# Patient Record
Sex: MALE | Race: WHITE | NOT HISPANIC OR LATINO | Employment: OTHER | URBAN - METROPOLITAN AREA
[De-identification: names, ages, dates, MRNs, and addresses within clinical notes are randomized per-mention and may not be internally consistent; named-entity substitution may affect disease eponyms.]

---

## 2020-11-15 ENCOUNTER — HOSPITAL ENCOUNTER (OUTPATIENT)
Dept: TELEMEDICINE | Facility: HOSPITAL | Age: 55
Discharge: HOME | End: 2020-11-15
Payer: MEDICARE

## 2020-11-15 DIAGNOSIS — I63.411 CEREBROVASCULAR ACCIDENT (CVA) DUE TO EMBOLISM OF RIGHT MIDDLE CEREBRAL ARTERY: ICD-10-CM

## 2020-11-15 PROCEDURE — G0427 PR INPT TELEHEALTH CON 70/>M: ICD-10-PCS | Mod: 95,,, | Performed by: PSYCHIATRY & NEUROLOGY

## 2020-11-15 PROCEDURE — G0427 INPT/ED TELECONSULT70: HCPCS | Mod: 95,,, | Performed by: PSYCHIATRY & NEUROLOGY

## 2020-11-16 ENCOUNTER — HOSPITAL ENCOUNTER (INPATIENT)
Facility: HOSPITAL | Age: 55
LOS: 1 days | Discharge: HOME | DRG: 064 | End: 2020-11-17
Attending: PSYCHIATRY & NEUROLOGY | Admitting: PSYCHIATRY & NEUROLOGY
Payer: MEDICARE

## 2020-11-16 DIAGNOSIS — I63.412 CEREBROVASCULAR ACCIDENT (CVA) DUE TO EMBOLISM OF LEFT MIDDLE CEREBRAL ARTERY: Primary | ICD-10-CM

## 2020-11-16 DIAGNOSIS — G45.9 TIA (TRANSIENT ISCHEMIC ATTACK): ICD-10-CM

## 2020-11-16 DIAGNOSIS — I63.9 STROKE: ICD-10-CM

## 2020-11-16 DIAGNOSIS — R53.1 WEAKNESS: ICD-10-CM

## 2020-11-16 PROBLEM — U07.1 COVID-19 VIRUS INFECTION: Status: ACTIVE | Noted: 2020-11-16

## 2020-11-16 PROBLEM — E66.09 OBESITY DUE TO EXCESS CALORIES: Status: ACTIVE | Noted: 2020-11-16

## 2020-11-16 LAB
ALBUMIN SERPL BCP-MCNC: 3.5 G/DL (ref 3.5–5.2)
ALLENS TEST: ABNORMAL
ALP SERPL-CCNC: 82 U/L (ref 55–135)
ALT SERPL W/O P-5'-P-CCNC: 21 U/L (ref 10–44)
ANION GAP SERPL CALC-SCNC: 9 MMOL/L (ref 8–16)
AST SERPL-CCNC: 21 U/L (ref 10–40)
BASOPHILS # BLD AUTO: 0.02 K/UL (ref 0–0.2)
BASOPHILS NFR BLD: 0.3 % (ref 0–1.9)
BILIRUB SERPL-MCNC: 0.3 MG/DL (ref 0.1–1)
BSA FOR ECHO PROCEDURE: 2.74 M2
BUN SERPL-MCNC: 15 MG/DL (ref 6–20)
CALCIUM SERPL-MCNC: 8.8 MG/DL (ref 8.7–10.5)
CHLORIDE SERPL-SCNC: 107 MMOL/L (ref 95–110)
CHOLEST SERPL-MCNC: 184 MG/DL (ref 120–199)
CHOLEST/HDLC SERPL: 4.5 {RATIO} (ref 2–5)
CO2 SERPL-SCNC: 25 MMOL/L (ref 23–29)
CREAT SERPL-MCNC: 0.9 MG/DL (ref 0.5–1.4)
CREAT SERPL-MCNC: 1 MG/DL (ref 0.5–1.4)
CTP QC/QA: YES
CV ECHO LV RWT: 0.38 CM
D DIMER PPP IA.FEU-MCNC: 1.26 MG/L FEU
DIFFERENTIAL METHOD: ABNORMAL
DOP CALC LVOT AREA: 4.9 CM2
DOP CALC LVOT DIAMETER: 2.51 CM
ECHO LV POSTERIOR WALL: 0.96 CM (ref 0.6–1.1)
EOSINOPHIL # BLD AUTO: 0 K/UL (ref 0–0.5)
EOSINOPHIL NFR BLD: 0.4 % (ref 0–8)
ERYTHROCYTE [DISTWIDTH] IN BLOOD BY AUTOMATED COUNT: 12.9 % (ref 11.5–14.5)
EST. GFR  (AFRICAN AMERICAN): >60 ML/MIN/1.73 M^2
EST. GFR  (NON AFRICAN AMERICAN): >60 ML/MIN/1.73 M^2
ESTIMATED AVG GLUCOSE: 114 MG/DL (ref 68–131)
FRACTIONAL SHORTENING: 38 % (ref 28–44)
GLUCOSE SERPL-MCNC: 143 MG/DL (ref 70–110)
GLUCOSE SERPL-MCNC: 146 MG/DL (ref 70–110)
HBA1C MFR BLD HPLC: 5.6 % (ref 4–5.6)
HCO3 UR-SCNC: 29.7 MMOL/L (ref 24–28)
HCT VFR BLD AUTO: 40.1 % (ref 40–54)
HDLC SERPL-MCNC: 41 MG/DL (ref 40–75)
HDLC SERPL: 22.3 % (ref 20–50)
HGB BLD-MCNC: 13.3 G/DL (ref 14–18)
IMM GRANULOCYTES # BLD AUTO: 0.03 K/UL (ref 0–0.04)
IMM GRANULOCYTES NFR BLD AUTO: 0.4 % (ref 0–0.5)
INR PPP: 1 (ref 0.8–1.2)
INTERVENTRICULAR SEPTUM: 0.89 CM (ref 0.6–1.1)
LDLC SERPL CALC-MCNC: 119.8 MG/DL (ref 63–159)
LEFT ATRIUM SIZE: 3.18 CM
LEFT INTERNAL DIMENSION IN SYSTOLE: 3.09 CM (ref 2.1–4)
LEFT VENTRICLE DIASTOLIC VOLUME INDEX: 44.53 ML/M2
LEFT VENTRICLE DIASTOLIC VOLUME: 118.7 ML
LEFT VENTRICLE MASS INDEX: 62 G/M2
LEFT VENTRICLE SYSTOLIC VOLUME INDEX: 14.1 ML/M2
LEFT VENTRICLE SYSTOLIC VOLUME: 37.5 ML
LEFT VENTRICULAR INTERNAL DIMENSION IN DIASTOLE: 5.01 CM (ref 3.5–6)
LEFT VENTRICULAR MASS: 164.57 G
LYMPHOCYTES # BLD AUTO: 1.5 K/UL (ref 1–4.8)
LYMPHOCYTES NFR BLD: 20.2 % (ref 18–48)
MCH RBC QN AUTO: 31.4 PG (ref 27–31)
MCHC RBC AUTO-ENTMCNC: 33.2 G/DL (ref 32–36)
MCV RBC AUTO: 95 FL (ref 82–98)
MONOCYTES # BLD AUTO: 0.8 K/UL (ref 0.3–1)
MONOCYTES NFR BLD: 10.8 % (ref 4–15)
MV PEAK E VEL: 0.92 M/S
NEUTROPHILS # BLD AUTO: 5.1 K/UL (ref 1.8–7.7)
NEUTROPHILS NFR BLD: 67.9 % (ref 38–73)
NONHDLC SERPL-MCNC: 143 MG/DL
NRBC BLD-RTO: 0 /100 WBC
PCO2 BLDA: 49.9 MMHG (ref 35–45)
PH SMN: 7.38 [PH] (ref 7.35–7.45)
PHENYTOIN SERPL-MCNC: 1.7 UG/ML (ref 10–20)
PLATELET # BLD AUTO: 230 K/UL (ref 150–350)
PMV BLD AUTO: 9.4 FL (ref 9.2–12.9)
PO2 BLDA: 30 MMHG (ref 40–60)
POC BE: 5 MMOL/L
POC PTINR: 1 (ref 0.9–1.2)
POC PTWBT: 12.3 SEC (ref 9.7–14.3)
POC SATURATED O2: 55 % (ref 95–100)
POC TCO2: 31 MMOL/L (ref 24–29)
POCT GLUCOSE: 146 MG/DL (ref 70–110)
POCT GLUCOSE: 98 MG/DL (ref 70–110)
POTASSIUM SERPL-SCNC: 3.7 MMOL/L (ref 3.5–5.1)
PROT SERPL-MCNC: 6.7 G/DL (ref 6–8.4)
PROTHROMBIN TIME: 11.1 SEC (ref 9–12.5)
RA PRESSURE: 15 MMHG
RBC # BLD AUTO: 4.23 M/UL (ref 4.6–6.2)
SAMPLE: ABNORMAL
SAMPLE: NORMAL
SAMPLE: NORMAL
SARS-COV-2 RDRP RESP QL NAA+PROBE: POSITIVE
SINUS: 4.05 CM
SITE: ABNORMAL
SODIUM SERPL-SCNC: 141 MMOL/L (ref 136–145)
STJ: 3.8 CM
TRIGL SERPL-MCNC: 116 MG/DL (ref 30–150)
TSH SERPL DL<=0.005 MIU/L-ACNC: 2.37 UIU/ML (ref 0.4–4)
WBC # BLD AUTO: 7.49 K/UL (ref 3.9–12.7)

## 2020-11-16 PROCEDURE — 80185 ASSAY OF PHENYTOIN TOTAL: CPT

## 2020-11-16 PROCEDURE — 80061 LIPID PANEL: CPT

## 2020-11-16 PROCEDURE — 99900035 HC TECH TIME PER 15 MIN (STAT)

## 2020-11-16 PROCEDURE — 25000003 PHARM REV CODE 250: Performed by: NURSE PRACTITIONER

## 2020-11-16 PROCEDURE — 99223 PR INITIAL HOSPITAL CARE,LEVL III: ICD-10-PCS | Mod: ,,, | Performed by: PSYCHIATRY & NEUROLOGY

## 2020-11-16 PROCEDURE — 82803 BLOOD GASES ANY COMBINATION: CPT

## 2020-11-16 PROCEDURE — 84443 ASSAY THYROID STIM HORMONE: CPT

## 2020-11-16 PROCEDURE — 80053 COMPREHEN METABOLIC PANEL: CPT

## 2020-11-16 PROCEDURE — U0002 COVID-19 LAB TEST NON-CDC: HCPCS | Performed by: PHYSICIAN ASSISTANT

## 2020-11-16 PROCEDURE — 99285 PR EMERGENCY DEPT VISIT,LEVEL V: ICD-10-PCS | Mod: ,,, | Performed by: PHYSICIAN ASSISTANT

## 2020-11-16 PROCEDURE — 83036 HEMOGLOBIN GLYCOSYLATED A1C: CPT

## 2020-11-16 PROCEDURE — 99223 1ST HOSP IP/OBS HIGH 75: CPT | Mod: ,,, | Performed by: PSYCHIATRY & NEUROLOGY

## 2020-11-16 PROCEDURE — 93010 ELECTROCARDIOGRAM REPORT: CPT | Mod: ,,, | Performed by: INTERNAL MEDICINE

## 2020-11-16 PROCEDURE — 36415 COLL VENOUS BLD VENIPUNCTURE: CPT

## 2020-11-16 PROCEDURE — 99285 EMERGENCY DEPT VISIT HI MDM: CPT | Mod: 25

## 2020-11-16 PROCEDURE — 85610 PROTHROMBIN TIME: CPT

## 2020-11-16 PROCEDURE — 82962 GLUCOSE BLOOD TEST: CPT

## 2020-11-16 PROCEDURE — 25500020 PHARM REV CODE 255: Performed by: EMERGENCY MEDICINE

## 2020-11-16 PROCEDURE — 99285 EMERGENCY DEPT VISIT HI MDM: CPT | Mod: ,,, | Performed by: PHYSICIAN ASSISTANT

## 2020-11-16 PROCEDURE — 93010 EKG 12-LEAD: ICD-10-PCS | Mod: ,,, | Performed by: INTERNAL MEDICINE

## 2020-11-16 PROCEDURE — 93005 ELECTROCARDIOGRAM TRACING: CPT

## 2020-11-16 PROCEDURE — 80047 BASIC METABLC PNL IONIZED CA: CPT

## 2020-11-16 PROCEDURE — 63600175 PHARM REV CODE 636 W HCPCS: Performed by: INTERNAL MEDICINE

## 2020-11-16 PROCEDURE — 82565 ASSAY OF CREATININE: CPT

## 2020-11-16 PROCEDURE — 94761 N-INVAS EAR/PLS OXIMETRY MLT: CPT

## 2020-11-16 PROCEDURE — 20600001 HC STEP DOWN PRIVATE ROOM

## 2020-11-16 PROCEDURE — 85025 COMPLETE CBC W/AUTO DIFF WBC: CPT

## 2020-11-16 PROCEDURE — 85379 FIBRIN DEGRADATION QUANT: CPT

## 2020-11-16 RX ORDER — ATORVASTATIN CALCIUM 10 MG/1
10 TABLET, FILM COATED ORAL DAILY
Status: ON HOLD | COMMUNITY
End: 2020-11-17 | Stop reason: SDUPTHER

## 2020-11-16 RX ORDER — ATORVASTATIN CALCIUM 10 MG/1
10 TABLET, FILM COATED ORAL DAILY
Status: DISCONTINUED | OUTPATIENT
Start: 2020-11-16 | End: 2020-11-16

## 2020-11-16 RX ORDER — LABETALOL HYDROCHLORIDE 5 MG/ML
10 INJECTION, SOLUTION INTRAVENOUS EVERY 6 HOURS PRN
Status: DISCONTINUED | OUTPATIENT
Start: 2020-11-16 | End: 2020-11-17 | Stop reason: HOSPADM

## 2020-11-16 RX ORDER — PHENYTOIN SODIUM 100 MG/1
200 CAPSULE, EXTENDED RELEASE ORAL DAILY
Status: DISCONTINUED | OUTPATIENT
Start: 2020-11-16 | End: 2020-11-16

## 2020-11-16 RX ORDER — SODIUM CHLORIDE 0.9 % (FLUSH) 0.9 %
10 SYRINGE (ML) INJECTION
Status: DISCONTINUED | OUTPATIENT
Start: 2020-11-16 | End: 2020-11-17 | Stop reason: HOSPADM

## 2020-11-16 RX ORDER — BISOPROLOL FUMARATE AND HYDROCHLOROTHIAZIDE 5; 6.25 MG/1; MG/1
1 TABLET ORAL DAILY
Status: DISCONTINUED | OUTPATIENT
Start: 2020-11-16 | End: 2020-11-16

## 2020-11-16 RX ORDER — PHENYTOIN SODIUM 100 MG/1
200 CAPSULE, EXTENDED RELEASE ORAL NIGHTLY
Status: DISCONTINUED | OUTPATIENT
Start: 2020-11-16 | End: 2020-11-16

## 2020-11-16 RX ORDER — BISOPROLOL FUMARATE AND HYDROCHLOROTHIAZIDE 5; 6.25 MG/1; MG/1
1 TABLET ORAL DAILY
COMMUNITY

## 2020-11-16 RX ORDER — ATORVASTATIN CALCIUM 20 MG/1
40 TABLET, FILM COATED ORAL DAILY
Status: DISCONTINUED | OUTPATIENT
Start: 2020-11-16 | End: 2020-11-17 | Stop reason: HOSPADM

## 2020-11-16 RX ORDER — PHENYTOIN SODIUM 100 MG/1
400 CAPSULE, EXTENDED RELEASE ORAL DAILY
Status: ON HOLD | COMMUNITY
End: 2020-11-17 | Stop reason: HOSPADM

## 2020-11-16 RX ORDER — ACETAMINOPHEN 325 MG/1
650 TABLET ORAL EVERY 6 HOURS PRN
Status: DISCONTINUED | OUTPATIENT
Start: 2020-11-16 | End: 2020-11-17 | Stop reason: HOSPADM

## 2020-11-16 RX ORDER — PHENYTOIN SODIUM 100 MG/1
CAPSULE, EXTENDED RELEASE ORAL 3 TIMES DAILY
COMMUNITY
End: 2020-11-16 | Stop reason: CLARIF

## 2020-11-16 RX ORDER — SIMVASTATIN 20 MG/1
20 TABLET, FILM COATED ORAL NIGHTLY
Status: ON HOLD | COMMUNITY
End: 2020-11-17 | Stop reason: HOSPADM

## 2020-11-16 RX ORDER — BISOPROLOL FUMARATE 5 MG/1
5 TABLET, FILM COATED ORAL DAILY
Status: DISCONTINUED | OUTPATIENT
Start: 2020-11-16 | End: 2020-11-17 | Stop reason: HOSPADM

## 2020-11-16 RX ORDER — LEVETIRACETAM 500 MG/1
1000 TABLET ORAL 2 TIMES DAILY
Status: DISCONTINUED | OUTPATIENT
Start: 2020-11-16 | End: 2020-11-17 | Stop reason: HOSPADM

## 2020-11-16 RX ORDER — ONDANSETRON 2 MG/ML
4 INJECTION INTRAMUSCULAR; INTRAVENOUS EVERY 12 HOURS PRN
Status: DISCONTINUED | OUTPATIENT
Start: 2020-11-16 | End: 2020-11-17 | Stop reason: HOSPADM

## 2020-11-16 RX ORDER — PHENYTOIN SODIUM 100 MG/1
200 CAPSULE, EXTENDED RELEASE ORAL NIGHTLY
Status: ON HOLD | COMMUNITY
End: 2020-11-17 | Stop reason: HOSPADM

## 2020-11-16 RX ADMIN — LEVETIRACETAM 1000 MG: 500 TABLET ORAL at 08:11

## 2020-11-16 RX ADMIN — HUMAN ALBUMIN MICROSPHERES AND PERFLUTREN 0.66 MG: 10; .22 INJECTION, SOLUTION INTRAVENOUS at 03:11

## 2020-11-16 RX ADMIN — ACETAMINOPHEN 650 MG: 325 TABLET ORAL at 08:11

## 2020-11-16 RX ADMIN — IOHEXOL 75 ML: 350 INJECTION, SOLUTION INTRAVENOUS at 02:11

## 2020-11-16 RX ADMIN — BISOPROLOL FUMARATE 5 MG: 5 TABLET, FILM COATED ORAL at 09:11

## 2020-11-16 RX ADMIN — PHENYTOIN SODIUM 200 MG: 100 CAPSULE ORAL at 04:11

## 2020-11-16 RX ADMIN — BISOPROLOL FUMARATE 5 MG: 5 TABLET, FILM COATED ORAL at 05:11

## 2020-11-16 RX ADMIN — RIVAROXABAN 10 MG: 10 TABLET, FILM COATED ORAL at 05:11

## 2020-11-16 RX ADMIN — ATORVASTATIN CALCIUM 40 MG: 20 TABLET, FILM COATED ORAL at 09:11

## 2020-11-16 RX ADMIN — PHENYTOIN SODIUM 200 MG: 100 CAPSULE ORAL at 09:11

## 2020-11-16 NOTE — CONSULTS
Food & Nutrition  Education    Diet Education: Stroke Pathway  Time Spent: 0 minutes  Learners: Pt      Nutrition Education provided with handouts: Heart Failure Diet and Stroke Prevention Eating Healthy education attached to discharge      Comments: Pt did not answer phone calls to provide education. Will f/u. All education attached to discharge papers. Recent diet advancement to Cardiac. Wt hx limited, no previous encounters with wts. UBW unknown.      All questions and concerns answered. Dietitian's contact information provided.       Follow-Up: 11/23/2020    Please Re-consult as needed        Thanks!  Nu Ochoa, Provisional LDN

## 2020-11-16 NOTE — SUBJECTIVE & OBJECTIVE
Woke up with symptoms?: no    Recent bleeding noted: no  Does the patient take any Blood Thinners? yes  Medications: Anticoagulants:  rivaroxaban/Xarelto      Past Medical History: hypertension, hyperlipidemia, Afib and epilepsy    Past Surgical History: no major surgeries within the last 2 weeks    Family History: no relevant history    Social History: no smoking, no drinking, no drugs    Allergies:  No known drug allergies    Review of Systems   Constitutional: Negative for chills and fever.   HENT: Negative for congestion and sore throat.    Eyes: Negative for visual disturbance.   Respiratory: Negative for shortness of breath.    Cardiovascular: Negative for chest pain and palpitations.   Gastrointestinal: Negative for blood in stool, diarrhea, nausea and vomiting.   Genitourinary: Negative for difficulty urinating and hematuria.   Musculoskeletal: Negative for back pain and neck pain.   Neurological: Positive for speech difficulty and weakness. Negative for dizziness and headaches.     Objective:   Vitals:  BP: 201/93 and Heart Rate: 107    CT READ: No    Physical Exam  Vitals signs reviewed.   Constitutional:       Appearance: Normal appearance. He is well-developed.   HENT:      Head: Normocephalic and atraumatic.      Nose: Nose normal.   Eyes:      Pupils: Pupils are equal, round, and reactive to light.   Cardiovascular:      Rate and Rhythm: Normal rate and regular rhythm.   Pulmonary:      Effort: Pulmonary effort is normal.   Neurological:      Mental Status: He is alert and oriented to person, place, and time.      Cranial Nerves: Cranial nerve deficit and facial asymmetry present.      Sensory: Sensory deficit present.      Motor: Weakness present.      Coordination: Coordination abnormal. Finger-Nose-Finger Test abnormal.      Comments: Left history loss and extinction.  Left hemiparesis   Psychiatric:         Mood and Affect: Mood normal.

## 2020-11-16 NOTE — PROGRESS NOTES
Patient seen with staff on rounds.  Additional history provided:    Patient has a history of seizures for past 6 years.  There was no provoking event.  He is unable to provide specifics of who first prescribed the dilantin and why this medication was chosen.  Patient reports he has self weaned his dilantin over a period of time without the assistance of a provider.  He currently has no neurologist.  He admits to sometime not taking his AED when he is having a few drinks.  He reports to having half a beer in the early afternoon and then later taking half of a flexeril for back spasms on the night of admission.    He is amenable to switching to a more appropriate AED.  Will switch to Keppra.    He has no recollection of the event.  There was no loss of bowel or bladder when he awoke on floor.  Symptoms have resolved back to baseline.    Patient initially denied COVID-19 symptoms but then did report losing sense of taste; which he attributed to seasonal allergies.  He also reports unprovoked dizziness and occipital headache intermittent over last several days.    D-Dimer elevated.  Currently on AC.    TTE unremarkable    MRI pending - delayed - will plan for dispo tomorrow (Patient and Family live in [place])    MAMADOU Ozuna  Vascular Neurology  [phone]

## 2020-11-16 NOTE — PLAN OF CARE
Plan of care reviewed with pt, verbalizes understanding. VSS. Pt AAOX4, ambulatory and independent. No acute events this  shift. Bed low and locked, call bell and personal items within reach.

## 2020-11-16 NOTE — H&P
[facility]  Vascular Neurology  Comprehensive Stroke Center  History & Physical    Inpatient consult to Vascular Neurology  Consult performed by: Allyssa Meneses NP  Consult ordered by: Johanne Huynh PA-C        Assessment/Plan:     Patient is a 55 y.o. 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 The following therapy evaluations are needed: None    Diagnostics ordered/pending: MRI head without contrast to assess brain parenchyma, TTE to assess cardiac function/status     VTE prophylaxis: Mechanical prophylaxis: Place SCDs  None: Reason for No Pharmacological VTE Prophylaxis: Currently on anticoagulation    BP parameters: TIA: SBP <220 until imaging confirmation of no infarct         Chronic a-fib  Stroke risk factor  continue home ZIAC  Xarelto 10 mg daily  Instructed patient on correct way to take Xarelto with food    Seizure disorder  Stroke risk factor  Continue home dilantin  Dilantin level  1.7  May need to increase dilantin    Hyperlipidemia, mixed  Stroke risk factor  .8  Lipitor 40 mg daily    Lupus anticoagulant disorder  Stroke risk factor      SRINIVAS (obstructive sleep apnea)  Stroke risk factor  Instructed patient he needs to start using machine at night at home    Obesity due to excess calories  Stroke risk factor   on diet and exercise    COVID-19 virus infection  Stroke risk factor  asymptomatic        STROKE DOCUMENTATION     Acute Stroke Times   Last Known Normal Date: 11/15/20  Last Known Normal Time: 1945  Symptom Onset Date: 11/15/20  Symptom  Onset Time: 2115  Stroke Team Called Date: 11/15/20  Stroke Team Called Time: 2204  Stroke Team Arrival Date: 11/16/20  Stroke Team Arrival Time: 0200    NIH Scale:  1a. Level of Consciousness: 0-->Alert, keenly responsive  1b. LOC Questions: 0-->Answers both questions correctly  1c. LOC Commands: 0-->Performs both tasks correctly  2. Best Gaze: 0-->Normal  3. Visual: 0-->No visual loss  4. Facial Palsy: 0-->Normal symmetrical movements  5a. Motor Arm, Left: 0-->No drift, limb holds 90 (or 45) degrees for full 10 secs  5b. Motor Arm, Right: 0-->No drift, limb holds 90 (or 45) degrees for full 10 secs  6a. Motor Leg, Left: 0-->No drift, leg holds 30 degree position for full 5 secs  6b. Motor Leg, Right: 0-->No drift, leg holds 30 degree position for full 5 secs  7. Limb Ataxia: 0-->Absent  8. Sensory: 0-->Normal, no sensory loss  9. Best Language: 0-->No aphasia, normal  10. Dysarthria: 0-->Normal  11. Extinction and Inattention (formerly Neglect): 0-->No abnormality  Total (NIH Stroke Scale): 0     Modified Kadie Score: 0  [place] Coma Scale:15   ABCD2 Score:    [id number]-VCG Score:2  HAS -BLED Score:2  ICH Score:   Hunt & Rodríguez Classification:      Thrombolysis Candidate? No, Current use of direct thrombin inhibitors (dabigatran) or direct factor Xa inhibitors (rivaroxaban, apixaban, edoxaban) with elevated sensitive laboratory tests     Delays to Thrombolysis?  No    Interventional Revascularization Candidate?   Is the patient eligible for mechanical endovascular reperfusion (FRANKIE)?  No; No large vessel occlusion    Hemorrhagic change of an Ischemic Stroke: Does this patient have an ischemic stroke with hemorrhagic changes? No         Subjective:     History of Present Illness:  54 y/o male who went to bed early as he was tired at 1945. He woke up on the floor unable to move the left side or speak properly. He final was able to crawl to the darrell and call for help. He was taken to [facility] where he was  seen in Telemedicine by Dr bowling. No TPA as on Xarelto.   Patient transferred to [facility] for further evaluation.   Patient states that for the past few days he has been feeling off and a little dizzy also having a slight headache   Patient tested COVID + at [place] did not find out till patient informed us on arrival plus result was in packet.    Patient went for CTA head and neck multiphase and no LVO seen but upon review of packet from [facility] and  they had already completed a CTA head and neck and did not notify the [facility]erral centerof this.     Patient states he he is back to baseline and feels fine.  Patient states that he has not had a seizure in 3 years and they had just decreased his Dilantin to 200 mg BID.  Upon talking with patient he takes his Xarelto in the am and not with food, instructed patient to take it at same time each day with food so he states will start taking it with evening dinner.     Patient states he has not been around sick people and has no COVID symptoms.     Patient could have had a TIA that resolved as not taking Xarelto properly vs seizure     Risk factors Chronic afib on Xarelto, SRINIVAS, HLP, obesity, seizures.        Past Medical History:   Diagnosis Date    Chronic a-fib     Hyperlipidemia, mixed     Lupus anticoagulant disorder     SRINIVAS (obstructive sleep apnea)     Seizure disorder      Past Surgical History:   Procedure Laterality Date    INGUINAL HERNIA REPAIR       History reviewed. No pertinent family history.  Social History     Tobacco Use    Smoking status: Never Smoker    Smokeless tobacco: Never Used   Substance Use Topics    Alcohol use: Yes    Drug use: Never     Review of patient's allergies indicates:  No Known Allergies    Medications: I have reviewed the current medication administration record.    (Not in a hospital admission)      Review of Systems   Constitutional: Negative for chills and fever.   HENT: Negative for ear  discharge and ear pain.    Eyes: Negative for pain and itching.   Respiratory: Negative for shortness of breath and stridor.    Cardiovascular: Positive for leg swelling.   Gastrointestinal: Negative for abdominal distention and abdominal pain.   Genitourinary: Negative for dysuria and enuresis.   Musculoskeletal: Negative for arthralgias and back pain.   Skin: Positive for color change. Negative for rash and wound.   Neurological: Positive for dizziness, speech difficulty, weakness and headaches.     Objective:     Vital Signs (Most Recent):  Pulse: 87 (11/16/20 0332)  Resp: 18 (11/16/20 0332)  BP: (!) 152/84 (11/16/20 0332)  SpO2: 97 % (11/16/20 0332)    Vital Signs Range (Last 24H):  Temp:  [98.2 °F (36.8 °C)]   Pulse:  []   Resp:  [18-23]   BP: (152-180)/()   SpO2:  [95 %-97 %]     Physical Exam  Vitals signs and nursing note reviewed.   Constitutional:       Appearance: Normal appearance. He is obese.   HENT:      Head: Normocephalic and atraumatic.   Eyes:      Extraocular Movements: Extraocular movements intact.      Pupils: Pupils are equal, round, and reactive to light.   Neck:      Musculoskeletal: Normal range of motion.   Cardiovascular:      Rate and Rhythm: Normal rate and regular rhythm.   Pulmonary:      Effort: Pulmonary effort is normal.      Breath sounds: Normal breath sounds.   Abdominal:      General: Abdomen is flat.      Palpations: Abdomen is soft.   Musculoskeletal: Normal range of motion.   Skin:     General: Skin is warm and dry.      Comments: Skin color changes both LE with orange peels skin   Neurological:      General: No focal deficit present.      Mental Status: He is alert and oriented to person, place, and time.         Neurological Exam:   LOC: alert  Attention Span: Good   Language: No aphasia  Articulation: No dysarthria  Orientation: Person, Place, Time   Visual Fields: Full  EOM (CN III, IV, VI): Full/intact  Pupils (CN II, III): PERRL  Facial Sensation (CN V):  Normal  Facial Movement (CN VII): Symmetric facial expression    Gag Reflex: present  Reflexes: flexor plantar responses bilaterally  Motor: Arm left  Normal 5/5  Leg left  Normal 5/5  Arm right  Normal 5/5  Leg right Normal 5/5  Cebellar: No evidence of appendicular or axial ataxia  Sensation: Intact to light touch, temperature and vibration  Tone: Normal tone throughout      Laboratory:  CMP:   Recent Labs   Lab 11/16/20  0329   CALCIUM 8.8   ALBUMIN 3.5   PROT 6.7      K 3.7   CO2 25      BUN 15   CREATININE 1.0   ALKPHOS 82   ALT 21   AST 21   BILITOT 0.3     CBC:   Recent Labs   Lab 11/16/20 0329   WBC 7.49   RBC 4.23*   HGB 13.3*   HCT 40.1      MCV 95   MCH 31.4*   MCHC 33.2     Lipid Panel:   Recent Labs   Lab 11/16/20  0329   CHOL 184   LDLCALC 119.8   HDL 41   TRIG 116     Coagulation:   Recent Labs   Lab 11/16/20 0329   INR 1.0     Hgb A1C:   Recent Labs   Lab 11/16/20 0329   HGBA1C 5.6     TSH:   Recent Labs   Lab 11/16/20 0329   TSH 2.372       Diagnostic Results:      Brain imaging:      Vessel Imaging:  CTA Head and neck multiphase 11-16-20 results:  No acute abnormality. No high-grade stenosis or major vessel occlusion.     Prominent perivascular space in the right basal ganglia.     Large low-density lesion in the right paratracheal mediastinum measuring up to 4.5 cm in craniocaudal length.  This finding could represent a cystic mediastinal lesion or potentially enlarged cystic lymph node.  Correlation with any remote outside imaging of the chest could be helpful to confirm stability if available.  Follow-up with outpatient contrast enhanced chest CT is suggested if no prior imaging is available.    Cardiac Evaluation:   EKG 11-16-20 results:  Atrial fibrillation  Nonspecific ST abnormality  Abnormal ECG  No previous ECGs available        Allyssa Meneses NP  [facility]  Department of Vascular Neurology   [facility]

## 2020-11-16 NOTE — ED TRIAGE NOTES
[facility]. Pt states that yesterday evening he went to bed early and at some point a few hours later woke up and fell out of bed. Pt states he had right sided weakness and was unable to pull himself back into bed. Pt transferred to [facility] and had Telestroke consult. Pt transferred to [facility] for Neuro consult. Prior to departure from [facility] pt symptoms subsided and pt regained all movement and feeling in all ext.

## 2020-11-16 NOTE — PLAN OF CARE
CM called and spoke with CHASITY JACOBS @ [phone] ([email or web address]) [place] [id number] for Discharge Planning Assessment. Per Chasity, he lives her and her family in a single family home on a slab foundation with threshold point of entry.  Patient was independent with ADLS and DID NOT use DME or in-home assistive equipment. He is not on dialysis  or COUMADIN,  Is not on HOME OXYGEN however does have CPAP Machine /takes medications as prescribed / keeps refilled / has resources for all daily and prescriptive needs.  Agreeable to bedside delivery.   Will have help from  Chasity and other household members as well as other immediate family upon discharge?All questions addressed. Unit and CM direct numbers provided. Will continue to follow for course of hospitalization    11/16/2020  1:56 AM  TIA (transient ischemic attack) [G45.9]  Weakness [R53.1]  Stroke [I63.9]    PCP: Primary Doctor No    PHARMACY: No Pharmacies Listed    Payor: /   [employer] ID# [id number], PLAN# [id number]      Alejandrina Vergara RN  Case Management  Ext [phone]        11/16/20 1410   Discharge Assessment   Assessment Type Discharge Planning Assessment   Confirmed/corrected address and phone number on facesheet? Yes   Assessment information obtained from? Caregiver  (Chasity Jacobs (Sallie) [phone])   Communicated expected length of stay with patient/caregiver yes   Prior to hospitilization cognitive status: Alert/Oriented;No Deficits   Prior to hospitalization functional status: Independent   Current cognitive status: No Deficits;Alert/Oriented   Current Functional Status: Independent   Facility Arrived From: [facility] following [facility] TELESTROKE VISIT   Lives With other relative(s)   Able to Return to Prior Arrangements yes   Is patient able to care for self after discharge? Yes   Who are your caregiver(s) and their phone number(s)? Chasity Jacobs (Sallie) [phone]   Patient's perception of discharge  disposition home or selfcare   Readmission Within the Last 30 Days no previous admission in last 30 days   Patient currently being followed by outpatient case management? No   Patient currently receives any other outside agency services? No   Equipment Currently Used at Home none   Do you have any problems affording any of your prescribed medications? No   Is the patient taking medications as prescribed? yes   Does the patient have transportation home? Yes   Transportation Anticipated family or friend will provide   Dialysis Name and Scheduled days N/A   Discharge Plan A Home with family;Home Health   Discharge Plan B Home   DME Needed Upon Discharge  other (see comments)  (TBD)   Patient/Family in Agreement with Plan yes

## 2020-11-16 NOTE — ASSESSMENT & PLAN NOTE
Stroke risk factor  continue home ZIAC  Xarelto 10 mg daily  Instructed patient on correct way to take Xarelto with food

## 2020-11-16 NOTE — ASSESSMENT & PLAN NOTE
54 y/o male with R MCA syndrome that resolved no TPA as on Xarelto, no LVO    Antithrombotics : Xarelto 10 mg daily    Statins: Lipitor 40 mg daily    Aggressive risk factor modification: HLD, Diet, Exercise, Obesity, A-Fib     Rehab efforts: The patient has been evaluated by a stroke team provider and the therapy needs have been fully considered based off the presenting complaints and exam findings. The following therapy evaluations are needed: None    Diagnostics ordered/pending: MRI head without contrast to assess brain parenchyma, TTE to assess cardiac function/status     VTE prophylaxis: Mechanical prophylaxis: Place SCDs  None: Reason for No Pharmacological VTE Prophylaxis: Currently on anticoagulation    BP parameters: TIA: SBP <220 until imaging confirmation of no infarct

## 2020-11-16 NOTE — CONSULTS
[facility]  Vascular Neurology  Comprehensive Stroke Center  Tele-Consultation Note      Consults    Consulting Provider: REMEDIOS PEREZ  Current Providers  No providers found    Patient Location: [facility] - TELEMEDICINE ED [facility] Emergency Department  Spoke hospital nurse at bedside with patient assisting consultant.     Patient information was obtained from patient.         Assessment/Plan:     STROKE DOCUMENTATION     Acute Stroke Times:   Acute Stroke Times   Last Known Normal Date: 11/15/20  Last Known Normal Time: 1945  Symptom Onset Date: 11/15/20  Symptom Onset Time: 1945  Stroke Team Called Date: 11/15/20  Stroke Team Called Time: 2202  Stroke Team Arrival Date: 11/15/20  Stroke Team Arrival Time: 2204    NIH Scale:  Interval: baseline  1a. Level of Consciousness: 0-->Alert, keenly responsive  1b. LOC Questions: 0-->Answers both questions correctly  1c. LOC Commands: 0-->Performs both tasks correctly  2. Best Gaze: 0-->Normal  3. Visual: 0-->No visual loss  4. Facial Palsy: 1-->Minor paralysis (flattened nasolabial fold, asymmetry on smiling)  5a. Motor Arm, Left: 1-->Drift, limb holds 90 (or 45) degrees, but drifts down before full 10 seconds, does not hit bed or other support  5b. Motor Arm, Right: 0-->No drift, limb holds 90 (or 45) degrees for full 10 secs  6a. Motor Leg, Left: 1-->Drift, leg falls by the end of the 5-sec period but does not hit bed  6b. Motor Leg, Right: 0-->No drift, leg holds 30 degree position for full 5 secs  7. Limb Ataxia: 0-->Absent  8. Sensory: 2-->Severe to total sensory loss, patient is not aware of being touched in the face, arm, and leg  9. Best Language: 0-->No aphasia, normal  10. Dysarthria: 0-->Normal  11. Extinction and Inattention (formerly Neglect): 1-->Visual, tactile, auditory, spatial, or personal inattention or extinction to bilateral simultaneous stimulation in one of the sensory  modalities  Total (NIH Stroke Scale): 6     Modified [place] Score: 0  Stanwood Coma Scale:    ABCD2 Score:    [id number] Score:3  HAS -BLED Score:2  ICH Score:   Hunt & Rodríguez Classification:       Diagnoses:   Cerebrovascular accident (CVA) due to embolism of right middle cerebral artery  Cerebrovascular accident (CVA) due to embolism of right middle cerebral artery  Antithrombotics for secondary stroke prevention: Anticoagulants: Rivaroxaban 20 mg daily    Statins for secondary stroke prevention and hyperlipidemia, if present:   Statins: Atorvastatin- 80 mg daily    Aggressive risk factor modification: HTN, HLD, Obesity, A-Fib     Rehab efforts: The patient has been evaluated by a stroke team provider and the therapy needs have been fully considered based off the presenting complaints and exam findings. The following therapy evaluations are needed: PT evaluate and treat, OT evaluate and treat, SLP evaluate and treat, PM&R evaluate for appropriate placement    Diagnostics ordered/pending: CTA Head to assess vasculature , CTA Neck/Arch to assess vasculature, Lipid Profile to assess cholesterol levels, MRI head without contrast to assess brain parenchyma, TTE to assess cardiac function/status     VTE prophylaxis: Mechanical prophylaxis: Place SCDs    BP parameters: Infarct: No intervention, SBP <220            There were no vitals taken for this visit.  Alteplase Eligible?: Yes  Alteplase Recommendation: Alteplase not recommended due to Full dose anticoagulation   Possible Interventional Revascularization Candidate? Yes    Disposition Recommendation: transfer to [facility] by  air  stat    Subjective:     History of Present Illness:  This is a 55-year-old male who was last known normal at 19 45 at which time the left hemiparesis and slurred speech.  The patient also complains of some right upper extremity weakness and dryness of the mouth.      Woke up with symptoms?: no    Recent bleeding noted: no  Does the  patient take any Blood Thinners? yes  Medications: Anticoagulants:  rivaroxaban/Xarelto      Past Medical History: hypertension, hyperlipidemia, Afib and epilepsy    Past Surgical History: no major surgeries within the last 2 weeks    Family History: no relevant history    Social History: no smoking, no drinking, no drugs    Allergies:  No known drug allergies    Review of Systems   Constitutional: Negative for chills and fever.   HENT: Negative for congestion and sore throat.    Eyes: Negative for visual disturbance.   Respiratory: Negative for shortness of breath.    Cardiovascular: Negative for chest pain and palpitations.   Gastrointestinal: Negative for blood in stool, diarrhea, nausea and vomiting.   Genitourinary: Negative for difficulty urinating and hematuria.   Musculoskeletal: Negative for back pain and neck pain.   Neurological: Positive for speech difficulty and weakness. Negative for dizziness and headaches.     Objective:   Vitals:  BP: 201/93 and Heart Rate: 107    CT READ: No    Physical Exam  Vitals signs reviewed.   Constitutional:       Appearance: Normal appearance. He is well-developed.   HENT:      Head: Normocephalic and atraumatic.      Nose: Nose normal.   Eyes:      Pupils: Pupils are equal, round, and reactive to light.   Cardiovascular:      Rate and Rhythm: Normal rate and regular rhythm.   Pulmonary:      Effort: Pulmonary effort is normal.   Neurological:      Mental Status: He is alert and oriented to person, place, and time.      Cranial Nerves: Cranial nerve deficit and facial asymmetry present.      Sensory: Sensory deficit present.      Motor: Weakness present.      Coordination: Coordination abnormal. Finger-Nose-Finger Test abnormal.      Comments: Left history loss and extinction.  Left hemiparesis   Psychiatric:         Mood and Affect: Mood normal.               Recommended the emergency room physician to have a brief discussion with the patient and/or family if available  regarding the risks and benefits of treatment, and to briefly document the occurrence of that discussion in his clinical encounter note.     The attending portion of this evaluation, treatment, and documentation was performed per Portia Qureshi MD via audiovisual.    Billing code:  (moderate to severe stroke, large areas of edema, some mimics)    · This patient has a critical neurological condition/illness, with high morbidity and mortality.  · There is a high probability for acute neurological change leading to clinical and possibly life-threatening deterioration requiring highest level of physician preparedness for urgent intervention.  · Care was coordinated with other physicians involved in the patient's care.  · Radiologic studies and laboratory data were reviewed and interpreted, and plan of care was re-assessed based on the results.  · Diagnosis, treatment options and prognosis may have been discussed with the patient and/or family members or caregiver.  · Further advanced medical management and further evaluation is warranted for his care.      In your opinion, this was a: Tier 1 Van Positive    Consult End Time: 2235     Portia Qureshi MD  [facility] Stroke Center  Vascular Neurology   [facility]

## 2020-11-16 NOTE — HPI
54 y/o male who went to bed early as he was tired at 1945. He woke up on the floor unable to move the left side or speak properly. He final was able to crawl to the darrell and call for help. He was taken to [facility] where he was seen in Telemedicine by Dr bowling. No TPA as on Xarelto.   Patient transferred to [facility] for further evaluation.   Patient states that for the past few days he has been feeling off and a little dizzy also having a slight headache   Patient tested COVID + at [facility] did not find out till patient informed us on arrival plus result was in packet.    Patient went for CTA head and neck multiphase and no LVO seen but upon review of packet from [facility] and  they had already completed a CTA head and neck and did not notify the [facility] this.     Patient states he he is back to baseline and feels fine.  Patient states that he has not had a seizure in 3 years and they had just decreased his Dilantin to 200 mg BID.  Upon talking with patient he takes his Xarelto in the am and not with food, instructed patient to take it at same time each day with food so he states will start taking it with evening dinner.     Patient states he has not been around sick people and has no COVID symptoms.     Patient could have had a TIA that resolved as not taking Xarelto properly vs seizure     Risk factors Chronic afib on Xarelto, SRINIVAS, HLP, obesity, seizures.

## 2020-11-16 NOTE — ASSESSMENT & PLAN NOTE
Cerebrovascular accident (CVA) due to embolism of right middle cerebral artery  Antithrombotics for secondary stroke prevention: Anticoagulants: Rivaroxaban 20 mg daily    Statins for secondary stroke prevention and hyperlipidemia, if present:   Statins: Atorvastatin- 80 mg daily    Aggressive risk factor modification: HTN, HLD, Obesity, A-Fib     Rehab efforts: The patient has been evaluated by a stroke team provider and the therapy needs have been fully considered based off the presenting complaints and exam findings. The following therapy evaluations are needed: PT evaluate and treat, OT evaluate and treat, SLP evaluate and treat, PM&R evaluate for appropriate placement    Diagnostics ordered/pending: CTA Head to assess vasculature , CTA Neck/Arch to assess vasculature, Lipid Profile to assess cholesterol levels, MRI head without contrast to assess brain parenchyma, TTE to assess cardiac function/status     VTE prophylaxis: Mechanical prophylaxis: Place SCDs    BP parameters: Infarct: No intervention, SBP <220

## 2020-11-16 NOTE — SUBJECTIVE & OBJECTIVE
Past Medical History:   Diagnosis Date    Chronic a-fib     Hyperlipidemia, mixed     Lupus anticoagulant disorder     SRINIVAS (obstructive sleep apnea)     Seizure disorder      Past Surgical History:   Procedure Laterality Date    INGUINAL HERNIA REPAIR       History reviewed. No pertinent family history.  Social History     Tobacco Use    Smoking status: Never Smoker    Smokeless tobacco: Never Used   Substance Use Topics    Alcohol use: Yes    Drug use: Never     Review of patient's allergies indicates:  No Known Allergies    Medications: I have reviewed the current medication administration record.    (Not in a hospital admission)      Review of Systems   Constitutional: Negative for chills and fever.   HENT: Negative for ear discharge and ear pain.    Eyes: Negative for pain and itching.   Respiratory: Negative for shortness of breath and stridor.    Cardiovascular: Positive for leg swelling.   Gastrointestinal: Negative for abdominal distention and abdominal pain.   Genitourinary: Negative for dysuria and enuresis.   Musculoskeletal: Negative for arthralgias and back pain.   Skin: Positive for color change. Negative for rash and wound.   Neurological: Positive for dizziness, speech difficulty, weakness and headaches.     Objective:     Vital Signs (Most Recent):  Pulse: 87 (11/16/20 0332)  Resp: 18 (11/16/20 0332)  BP: (!) 152/84 (11/16/20 0332)  SpO2: 97 % (11/16/20 0332)    Vital Signs Range (Last 24H):  Temp:  [98.2 °F (36.8 °C)]   Pulse:  []   Resp:  [18-23]   BP: (152-180)/()   SpO2:  [95 %-97 %]     Physical Exam  Vitals signs and nursing note reviewed.   Constitutional:       Appearance: Normal appearance. He is obese.   HENT:      Head: Normocephalic and atraumatic.   Eyes:      Extraocular Movements: Extraocular movements intact.      Pupils: Pupils are equal, round, and reactive to light.   Neck:      Musculoskeletal: Normal range of motion.   Cardiovascular:      Rate and  Rhythm: Normal rate and regular rhythm.   Pulmonary:      Effort: Pulmonary effort is normal.      Breath sounds: Normal breath sounds.   Abdominal:      General: Abdomen is flat.      Palpations: Abdomen is soft.   Musculoskeletal: Normal range of motion.   Skin:     General: Skin is warm and dry.      Comments: Skin color changes both LE with orange peels skin   Neurological:      General: No focal deficit present.      Mental Status: He is alert and oriented to person, place, and time.         Neurological Exam:   LOC: alert  Attention Span: Good   Language: No aphasia  Articulation: No dysarthria  Orientation: Person, Place, Time   Visual Fields: Full  EOM (CN III, IV, VI): Full/intact  Pupils (CN II, III): PERRL  Facial Sensation (CN V): Normal  Facial Movement (CN VII): Symmetric facial expression    Gag Reflex: present  Reflexes: flexor plantar responses bilaterally  Motor: Arm left  Normal 5/5  Leg left  Normal 5/5  Arm right  Normal 5/5  Leg right Normal 5/5  Cebellar: No evidence of appendicular or axial ataxia  Sensation: Intact to light touch, temperature and vibration  Tone: Normal tone throughout      Laboratory:  CMP:   Recent Labs   Lab 11/16/20 0329   CALCIUM 8.8   ALBUMIN 3.5   PROT 6.7      K 3.7   CO2 25      BUN 15   CREATININE 1.0   ALKPHOS 82   ALT 21   AST 21   BILITOT 0.3     CBC:   Recent Labs   Lab 11/16/20 0329   WBC 7.49   RBC 4.23*   HGB 13.3*   HCT 40.1      MCV 95   MCH 31.4*   MCHC 33.2     Lipid Panel:   Recent Labs   Lab 11/16/20 0329   CHOL 184   LDLCALC 119.8   HDL 41   TRIG 116     Coagulation:   Recent Labs   Lab 11/16/20 0329   INR 1.0     Hgb A1C:   Recent Labs   Lab 11/16/20 0329   HGBA1C 5.6     TSH:   Recent Labs   Lab 11/16/20 0329   TSH 2.372       Diagnostic Results:      Brain imaging:      Vessel Imaging:  CTA Head and neck multiphase 11-16-20 results:  No acute abnormality. No high-grade stenosis or major vessel occlusion.     Prominent  perivascular space in the right basal ganglia.     Large low-density lesion in the right paratracheal mediastinum measuring up to 4.5 cm in craniocaudal length.  This finding could represent a cystic mediastinal lesion or potentially enlarged cystic lymph node.  Correlation with any remote outside imaging of the chest could be helpful to confirm stability if available.  Follow-up with outpatient contrast enhanced chest CT is suggested if no prior imaging is available.    Cardiac Evaluation:   EKG 11-16-20 results:  Atrial fibrillation  Nonspecific ST abnormality  Abnormal ECG  No previous ECGs available

## 2020-11-16 NOTE — PLAN OF CARE
This CM received call from YARY JACOBS (niece/caregiver) at [phone] requesting call from provider for update and to answer questions. Message relatyed to attending Dr Nu Jimenez.    Alejandrina Vergara, RN  Case Management  Ext [phone]

## 2020-11-16 NOTE — PLAN OF CARE
Currently not stable for discharge. Anticipate home with family and home health. Will continue to follow.    Diagnoses:   Cerebrovascular accident (CVA) due to embolism of right middle cerebral artery  Cerebrovascular accident (CVA) due to embolism of right middle cerebral artery  Antithrombotics for secondary stroke prevention: Anticoagulants: Rivaroxaban 20 mg daily     Statins for secondary stroke prevention and hyperlipidemia, if present:   Statins: Atorvastatin- 80 mg daily     Aggressive risk factor modification: HTN, HLD, Obesity, A-Fib     Rehab efforts: The patient has been evaluated by a stroke team provider and the therapy needs have been fully considered based off the presenting complaints and exam findings. The following therapy evaluations are needed: PT evaluate and treat, OT evaluate and treat, SLP evaluate and treat, PM&R evaluate for appropriate placement     Diagnostics ordered/pending: CTA Head to assess vasculature , CTA Neck/Arch to assess vasculature, Lipid Profile to assess cholesterol levels, MRI head without contrast to assess brain parenchyma, TTE to assess cardiac function/status      VTE prophylaxis: Mechanical prophylaxis: Place SCDs     BP parameters: Infarct: No intervention, SBP <220      Alejandrina Vergara RN  Case Management  Ext [phone]       11/16/20 1406   Post-Acute Status   Post-Acute Authorization Other   Post-Acute Placement Status Awaiting Internal Medical Clearance   Discharge Delays None known at this time   Discharge Plan   Discharge Plan A Home;Home Health   Discharge Plan B Home with family

## 2020-11-16 NOTE — ED PROVIDER NOTES
Encounter Date: 11/15/2020       History     Chief Complaint   Patient presents with    [place] Stroke Tx     Pt arrives via [facility] from [place] for CVA. LKN 1945. C/o right sided weakness with drift. Hx of CVA. symptoms resolved.     55-year-old male with lupus anticoagulant syndrome on Xarelto, atrial fibrillation, seizure disorder presents as a transfer from [facility] for acute stroke.  Patient reports that he has had headache for a few days with some dizziness and generalized weakness.  Around 830 or 9:00 p.m. tonight, he fell out of his bed and noted profound right-sided weakness and inability to speak.  He states that these symptoms have completely resolved and now he feels normal again.  He still has a mild headache but denies any other complaints.  No visual changes, numbness, presented weakness, difficulty with speech or other complaints.  Patient was COVID-19 positive at the sending facility, denies fever/chills, myalgias, cough, shortness of breath, chest pain, abdominal pain or nausea/vomiting/diarrhea.  No known sick contacts.        Review of patient's allergies indicates:  No Known Allergies  Past Medical History:   Diagnosis Date    Chronic a-fib     Hyperlipidemia, mixed     Lupus anticoagulant disorder     SRINIVAS (obstructive sleep apnea)     Seizure disorder      Past Surgical History:   Procedure Laterality Date    INGUINAL HERNIA REPAIR       History reviewed. No pertinent family history.  Social History     Tobacco Use    Smoking status: Never Smoker    Smokeless tobacco: Never Used   Substance Use Topics    Alcohol use: Yes    Drug use: Never     Review of Systems   Constitutional: Negative for chills, diaphoresis, fatigue and fever.   HENT: Negative for sore throat.    Respiratory: Negative for cough and shortness of breath.    Cardiovascular: Negative for chest pain.   Gastrointestinal: Negative for abdominal pain, diarrhea, nausea and vomiting.   Genitourinary:  Negative for dysuria.   Musculoskeletal: Negative for back pain.   Skin: Negative for rash.   Neurological: Positive for dizziness, speech difficulty, weakness and headaches. Negative for tremors, seizures, syncope, facial asymmetry, light-headedness and numbness.   Hematological: Does not bruise/bleed easily.       Physical Exam     Initial Vitals [11/16/20 0232]   BP Pulse Resp Temp SpO2   (!) 156/97 94 20 -- 96 %      MAP       --         Physical Exam    Nursing note and vitals reviewed.  Constitutional: He appears well-developed and well-nourished. He is not diaphoretic. No distress.   HENT:   Head: Normocephalic and atraumatic.   Eyes: EOM are normal. Pupils are equal, round, and reactive to light.   Neck: Normal range of motion. Neck supple.   Cardiovascular: Normal rate, regular rhythm, normal heart sounds and intact distal pulses. Exam reveals no gallop and no friction rub.    No murmur heard.  Pulmonary/Chest: Breath sounds normal. No respiratory distress. He has no wheezes. He has no rales. He exhibits no tenderness.   Musculoskeletal: Normal range of motion.   Neurological: He is alert and oriented to person, place, and time. He has normal strength. No cranial nerve deficit or sensory deficit. GCS score is 15. GCS eye subscore is 4. GCS verbal subscore is 5. GCS motor subscore is 6.   No facial droop, normal speech, normal strength and sensation   Skin: Skin is warm and dry.   Psychiatric: He has a normal mood and affect.         ED Course   Procedures  Labs Reviewed   CBC W/ AUTO DIFFERENTIAL - Abnormal; Notable for the following components:       Result Value    RBC 4.23 (*)     Hemoglobin 13.3 (*)     MCH 31.4 (*)     All other components within normal limits   COMPREHENSIVE METABOLIC PANEL - Abnormal; Notable for the following components:    Glucose 143 (*)     All other components within normal limits   PHENYTOIN LEVEL, TOTAL - Abnormal; Notable for the following components:    Phenytoin Lvl 1.7 (*)      All other components within normal limits   SARS-COV-2 RDRP GENE - Abnormal; Notable for the following components:    POC Rapid COVID Positive (*)     All other components within normal limits    Narrative:     This test utilizes isothermal nucleic acid amplification   technology to detect the SARS-CoV-2 RdRp nucleic acid segment.   The analytical sensitivity (limit of detection) is 125 genome   equivalents/mL.   A POSITIVE result implies infection with the SARS-CoV-2 virus;   the patient is presumed to be contagious.     A NEGATIVE result means that SARS-CoV-2 nucleic acids are not   present above the limit of detection. A NEGATIVE result should be   treated as presumptive. It does not rule out the possibility of   COVID-19 and should not be the sole basis for treatment decisions.   If COVID-19 is strongly suspected based on clinical and exposure   history, re-testing using an alternate molecular assay should be   considered.   This test is only for use under the Food and Drug   Administration s Emergency Use Authorization (EUA).   Commercial kits are provided by [employer].   Performance characteristics of the EUA have been independently   verified by [facility] Department of   Pathology and Laboratory Medicine.   _________________________________________________________________   The authorized Fact Sheet for Healthcare Providers and the authorized Fact   Sheet for Patients of the ID NOW COVID-19 are available on the FDA   website:     https://www.fda.gov/media/536382/download  https://www.fda.gov/media/365686/download       POCT GLUCOSE, HAND-HELD DEVICE - Abnormal; Notable for the following components:    POC Glucose 146 (*)     All other components within normal limits   POCT GLUCOSE - Abnormal; Notable for the following components:    POCT Glucose 146 (*)     All other components within normal limits   ISTAT PROCEDURE - Abnormal; Notable for the following components:    POC PCO2 49.9  (*)     POC PO2 30 (*)     POC HCO3 29.7 (*)     POC SATURATED O2 55 (*)     POC TCO2 31 (*)     All other components within normal limits   PROTIME-INR   TSH   LIPID PANEL   HEMOGLOBIN A1C   ISTAT CREATININE   ISTAT PROCEDURE     EKG Readings: (Independently Interpreted)   Initial Reading: No STEMI. Rhythm: Atrial Fibrillation. Heart Rate: 92. Ectopy: No Ectopy. ST Segments: Normal ST Segments. Clinical Impression: Atrial Fibrillation       Imaging Results          CTA STROKE MULTI-PHASE (Final result)  Result time 11/16/20 02:56:43    Final result by Jarad Garcias MD (11/16/20 02:56:43)                 Impression:      No acute abnormality. No high-grade stenosis or major vessel occlusion.    Prominent perivascular space in the right basal ganglia.    Large low-density lesion in the right paratracheal mediastinum measuring up to 4.5 cm in craniocaudal length.  This finding could represent a cystic mediastinal lesion or potentially enlarged cystic lymph node.  Correlation with any remote outside imaging of the chest could be helpful to confirm stability if available.  Follow-up with outpatient contrast enhanced chest CT is suggested if no prior imaging is available.    Electronically signed by resident: Taco Trevino  Date:    11/16/2020  Time:    02:21    Electronically signed by: Jarad Garcias MD  Date:    11/16/2020  Time:    02:56             Narrative:    EXAMINATION:  CTA STROKE MULTI-PHASE    CLINICAL HISTORY:  stroke;    TECHNIQUE:  Non contrast low dose axial images were obtained thought the head. CT angiogram was performed from the level of the campbell to the top of the head following the IV administration of 75mL of Omnipaque 350.   Sagittal and coronal reconstructions and maximum intensity projection reconstructions were performed. Arterial stenosis percentages are based on NASCET measurement criteria.  Two additional phases of immediate post-contrast CTA images were performed through the head  alone.    COMPARISON:  None    FINDINGS:  CT head:    The ventricles are normal in size without evidence of hydrocephalus.    There is a rounded area of CSF attenuation in the right basal ganglia just superior to the M1 segment of the right middle cerebral artery which is favored to represent a prominent perivascular space.  A branch vessel from the right MCA can be seen coursing through the space on CTA.  Gray-white differentiation is preserved without evidence of acute major vascular distribution infarct.  No parenchymal mass, hemorrhage, edema, mass effect, or midline shift.  No abnormal postcontrast parenchymal enhancement.    No extra-axial blood or fluid collections.    The cranium appears intact. Mastoid air cells and paranasal sinuses are essentially clear.      CTA:    The aortic arch maintains a normal left-sided 3-vessel branching pattern.  No significant atherosclerosis or stenosis at the origins of the major aortic branches.    The common and internal carotid arteries are normal in course and caliber.  Mild calcified and noncalcified plaque at the right carotid bifurcation.  No significant stenosis in either carotid bifurcation.    The vertebral origins are patent.  The left vertebral artery is dominant.  The right vertebral artery is diminutive in caliber throughout its cervical and intracranial course and terminates as the PICA.  The intracranial vertebrobasilar system is completely supplied by the left vertebral artery.  Vertebrobasilar system is within normal limits without significant atherosclerosis, focal stenosis, or occlusion.    Hypoplastic right A1 segment of the anterior cerebral artery.  The middle and posterior cerebral arteries are within normal limits, without evidence of significant stenosis, focal occlusion, or intracranial aneurysm formation.  Incidentally noted fetal origin of the right PCA.    Low-density lesion in the right paratracheal region measures approximately 3.4 x 2.9 x 4.5  cm.  This lesion demonstrates relatively sharp margins and no significant adjacent inflammatory stranding.  Lung apices are unremarkable.                                 Medical Decision Making:   History:   Old Medical Records: I decided to obtain old medical records.  Old Records Summarized: records from another hospital.       <> Summary of Records: Patient had CTA performed at outside facility which showed mild right ICA stenosis.  Initial Assessment:   55-year-old male presents as a transfer for acute stroke.  Upon arrival in the ED, he is hypertensive 156/97 with otherwise normal vitals.  His neurological symptoms seem to have mostly resolved, he has normal strength and sensation, normal speech and no facial droop.  Differential Diagnosis:   TIA  Hypertensive emergency  Coagulopathy  Complicated migraine  Patient is essentially asymptomatic from his COVID-19, however this may be contributing to his headache and pro-thrombotic state    Independently Interpreted Test(s):   I have ordered and independently interpreted EKG Reading(s) - see prior notes  Clinical Tests:   Lab Tests: Ordered and Reviewed  Radiological Study: Ordered and Reviewed  Medical Tests: Ordered and Reviewed  ED Management:  Patient placed on airborne precautions, will do CTA and consult vascular Neurology.    I discussed this patient with vascular Neurology nurse practitioner.  Patient has been taking low doses Xarelto has not been taking it with food, therefore he may be subtherapeutic.  He will be admitted to vascular Neurology service for TIA.  Patient comfortable with admission.  I discussed this patient with my supervising physician.  Other:   I have discussed this case with another health care provider.       <> Summary of the Discussion: I discussed this patient with vascular Neurology nurse practitioner.  Patient has been taking low doses Xarelto has not been taking it with food, therefore he may be subtherapeutic.  He will be  admitted to vascular Neurology service for TIA.                   ED Course as of Nov 16 0722   Mon Nov 16, 2020   0324 POCT COVID-19 Rapid Screening(!) [CC]      ED Course User Index  [CC] Johanne Huynh PA-C            Clinical Impression:     ICD-10-CM ICD-9-CM   1. TIA R53.1 780.79   2. COVID-19 I63.9 434.91   3. Lupus anticoagulant syndrome G45.9 435.9                      Disposition:   Disposition: Admitted  Condition: Fair     ED Disposition Condition    Admit                             Johanne Huynh PA-C  11/16/20 1489

## 2020-11-16 NOTE — HPI
This is a 55-year-old male who was last known normal at 19 45 at which time the left hemiparesis and slurred speech.  The patient also complains of some right upper extremity weakness and dryness of the mouth.

## 2020-11-17 ENCOUNTER — NURSE TRIAGE (OUTPATIENT)
Dept: ADMINISTRATIVE | Facility: CLINIC | Age: 55
End: 2020-11-17

## 2020-11-17 VITALS
BODY MASS INDEX: 37.64 KG/M2 | DIASTOLIC BLOOD PRESSURE: 80 MMHG | SYSTOLIC BLOOD PRESSURE: 144 MMHG | HEART RATE: 78 BPM | HEIGHT: 76 IN | WEIGHT: 309.06 LBS | RESPIRATION RATE: 21 BRPM | OXYGEN SATURATION: 96 % | TEMPERATURE: 98 F

## 2020-11-17 PROBLEM — I63.412 CEREBROVASCULAR ACCIDENT (CVA) DUE TO EMBOLISM OF LEFT MIDDLE CEREBRAL ARTERY: Status: ACTIVE | Noted: 2020-11-16

## 2020-11-17 LAB
ALBUMIN SERPL BCP-MCNC: 3.5 G/DL (ref 3.5–5.2)
ALP SERPL-CCNC: 83 U/L (ref 55–135)
ALT SERPL W/O P-5'-P-CCNC: 21 U/L (ref 10–44)
ANION GAP SERPL CALC-SCNC: 10 MMOL/L (ref 8–16)
AST SERPL-CCNC: 17 U/L (ref 10–40)
BASOPHILS # BLD AUTO: 0.01 K/UL (ref 0–0.2)
BASOPHILS NFR BLD: 0.2 % (ref 0–1.9)
BILIRUB SERPL-MCNC: 0.5 MG/DL (ref 0.1–1)
BUN SERPL-MCNC: 14 MG/DL (ref 6–20)
CALCIUM SERPL-MCNC: 8.9 MG/DL (ref 8.7–10.5)
CHLORIDE SERPL-SCNC: 108 MMOL/L (ref 95–110)
CO2 SERPL-SCNC: 25 MMOL/L (ref 23–29)
CREAT SERPL-MCNC: 0.8 MG/DL (ref 0.5–1.4)
DIFFERENTIAL METHOD: ABNORMAL
EOSINOPHIL # BLD AUTO: 0.1 K/UL (ref 0–0.5)
EOSINOPHIL NFR BLD: 1.6 % (ref 0–8)
ERYTHROCYTE [DISTWIDTH] IN BLOOD BY AUTOMATED COUNT: 12.8 % (ref 11.5–14.5)
EST. GFR  (AFRICAN AMERICAN): >60 ML/MIN/1.73 M^2
EST. GFR  (NON AFRICAN AMERICAN): >60 ML/MIN/1.73 M^2
GLUCOSE SERPL-MCNC: 96 MG/DL (ref 70–110)
HCT VFR BLD AUTO: 42.4 % (ref 40–54)
HGB BLD-MCNC: 13.9 G/DL (ref 14–18)
IMM GRANULOCYTES # BLD AUTO: 0.03 K/UL (ref 0–0.04)
IMM GRANULOCYTES NFR BLD AUTO: 0.5 % (ref 0–0.5)
LYMPHOCYTES # BLD AUTO: 1.4 K/UL (ref 1–4.8)
LYMPHOCYTES NFR BLD: 23.1 % (ref 18–48)
MAGNESIUM SERPL-MCNC: 2.2 MG/DL (ref 1.6–2.6)
MCH RBC QN AUTO: 31 PG (ref 27–31)
MCHC RBC AUTO-ENTMCNC: 32.8 G/DL (ref 32–36)
MCV RBC AUTO: 95 FL (ref 82–98)
MONOCYTES # BLD AUTO: 0.8 K/UL (ref 0.3–1)
MONOCYTES NFR BLD: 12.9 % (ref 4–15)
NEUTROPHILS # BLD AUTO: 3.8 K/UL (ref 1.8–7.7)
NEUTROPHILS NFR BLD: 61.7 % (ref 38–73)
NRBC BLD-RTO: 0 /100 WBC
PHOSPHATE SERPL-MCNC: 4.4 MG/DL (ref 2.7–4.5)
PLATELET # BLD AUTO: 222 K/UL (ref 150–350)
PMV BLD AUTO: 10.1 FL (ref 9.2–12.9)
POTASSIUM SERPL-SCNC: 3.9 MMOL/L (ref 3.5–5.1)
PROT SERPL-MCNC: 6.5 G/DL (ref 6–8.4)
RBC # BLD AUTO: 4.48 M/UL (ref 4.6–6.2)
SODIUM SERPL-SCNC: 143 MMOL/L (ref 136–145)
WBC # BLD AUTO: 6.18 K/UL (ref 3.9–12.7)

## 2020-11-17 PROCEDURE — 25000003 PHARM REV CODE 250: Performed by: NURSE PRACTITIONER

## 2020-11-17 PROCEDURE — 80053 COMPREHEN METABOLIC PANEL: CPT

## 2020-11-17 PROCEDURE — 99239 HOSP IP/OBS DSCHRG MGMT >30: CPT | Mod: ,,, | Performed by: PSYCHIATRY & NEUROLOGY

## 2020-11-17 PROCEDURE — 85025 COMPLETE CBC W/AUTO DIFF WBC: CPT

## 2020-11-17 PROCEDURE — 36415 COLL VENOUS BLD VENIPUNCTURE: CPT

## 2020-11-17 PROCEDURE — 99239 PR HOSPITAL DISCHARGE DAY,>30 MIN: ICD-10-PCS | Mod: ,,, | Performed by: PSYCHIATRY & NEUROLOGY

## 2020-11-17 PROCEDURE — 84100 ASSAY OF PHOSPHORUS: CPT

## 2020-11-17 PROCEDURE — 94761 N-INVAS EAR/PLS OXIMETRY MLT: CPT

## 2020-11-17 PROCEDURE — 83735 ASSAY OF MAGNESIUM: CPT

## 2020-11-17 RX ORDER — LEVETIRACETAM 1000 MG/1
1000 TABLET ORAL 2 TIMES DAILY
Qty: 60 TABLET | Refills: 11 | Status: SHIPPED | OUTPATIENT
Start: 2020-11-17 | End: 2021-11-17

## 2020-11-17 RX ORDER — MUPIROCIN 20 MG/G
OINTMENT TOPICAL 2 TIMES DAILY
Status: DISCONTINUED | OUTPATIENT
Start: 2020-11-17 | End: 2020-11-17 | Stop reason: HOSPADM

## 2020-11-17 RX ORDER — MUPIROCIN 20 MG/G
OINTMENT TOPICAL 2 TIMES DAILY
Qty: 1 TUBE | Refills: 0 | Status: SHIPPED | OUTPATIENT
Start: 2020-11-17 | End: 2020-11-22

## 2020-11-17 RX ORDER — ATORVASTATIN CALCIUM 10 MG/1
40 TABLET, FILM COATED ORAL DAILY
Qty: 30 TABLET | Refills: 12 | Status: SHIPPED | OUTPATIENT
Start: 2020-11-17

## 2020-11-17 RX ADMIN — LEVETIRACETAM 1000 MG: 500 TABLET ORAL at 08:11

## 2020-11-17 RX ADMIN — ATORVASTATIN CALCIUM 40 MG: 20 TABLET, FILM COATED ORAL at 08:11

## 2020-11-17 RX ADMIN — BISOPROLOL FUMARATE 5 MG: 5 TABLET, FILM COATED ORAL at 09:11

## 2020-11-17 RX ADMIN — HYDROCHLOROTHIAZIDE 6.5 MG: 50 TABLET ORAL at 08:11

## 2020-11-17 NOTE — NURSING
Pt's ride here already from [place]. Pt anxious to leave, informed that MD still did not put in discharge order. Was called by stroke care coordinator a few minutes ago who said she was working on his prescriptions and was unable to escript his prescription to [facility] in [place] and will [place] [facility].  Pt does not want to wait for bedside delivery and does not want to deal with the hassle of [facility] pharmacy when trying to refill prescription.   Paged Allyssa Meneses to notify

## 2020-11-17 NOTE — PROGRESS NOTES
[facility] - ICU 15 WT  Vascular Neurology  Comprehensive Stroke Center  Progress Note    Assessment/Plan:     * Cerebrovascular accident (CVA) due to embolism of left middle cerebral artery  56 y/o male with R MCA syndrome that resolved no TPA as on Xarelto, no LVO    Antithrombotics : Xarelto 20 mg daily    Statins: Lipitor 40 mg daily    Aggressive risk factor modification: HLD, Diet, Exercise, Obesity, A-Fib     Rehab efforts: The patient has been evaluated by a stroke team provider and the therapy needs have been fully considered based off the presenting complaints and exam findings. The following therapy evaluations are needed: None    Diagnostics ordered/pending: None     VTE prophylaxis: Mechanical prophylaxis: Place SCDs  None: Reason for No Pharmacological VTE Prophylaxis: Currently on anticoagulation    BP parameters: TIA: SBP <220 until imaging confirmation of no infarct         Chronic a-fib  Stroke risk factor  continue home ZIAC  Xarelto 20 mg daily  Instructed patient on correct way to take Xarelto with food    Seizure disorder  Stroke risk factor  Continue home dilantin  Dilantin level  1.7  Patient changed to Keppra BID    Hyperlipidemia, mixed  Stroke risk factor  .8  Lipitor 40 mg daily    Lupus anticoagulant disorder  Stroke risk factor      SRINIVAS (obstructive sleep apnea)  Stroke risk factor  Instructed patient he needs to start using machine at night at home    Obesity due to excess calories  Stroke risk factor   on diet and exercise    COVID-19 virus infection  Stroke risk factor  Asymptomatic  Self quarantine for 14 days when discharged         11-16-20 56 y/o male with L MCA syndrome that resolved with no LVO and no TPA as on Xarelto. Patient COVID +.   Patient states that he takes his Xarelto in the am without food.   Patient changed to keppra from Dilantin, Lipitor increased to 40 mg daily  11-17-20 MRI brain reveals R subinsular cortex and R Parietal lobe  Long  discussion with patient regarding taking Xarelto correctly with dinner,  Need to self quarantine for 14 days when he is discharged home today  Keppra to be delivered at bedside as unable to send to Pharmacy in [place]   Will place order for home care COVID program      STROKE DOCUMENTATION   Acute Stroke Times   Last Known Normal Date: 11/15/20  Last Known Normal Time: 1945  Symptom Onset Date: 11/15/20  Symptom Onset Time: 2115  Stroke Team Called Date: 11/15/20  Stroke Team Called Time: 2204  Stroke Team Arrival Date: 11/16/20  Stroke Team Arrival Time: 0200    NIH Scale:  1a. Level of Consciousness: 0-->Alert, keenly responsive  1b. LOC Questions: 0-->Answers both questions correctly  1c. LOC Commands: 0-->Performs both tasks correctly  2. Best Gaze: 0-->Normal  3. Visual: 0-->No visual loss  4. Facial Palsy: 0-->Normal symmetrical movements  5a. Motor Arm, Left: 0-->No drift, limb holds 90 (or 45) degrees for full 10 secs  5b. Motor Arm, Right: 0-->No drift, limb holds 90 (or 45) degrees for full 10 secs  6a. Motor Leg, Left: 0-->No drift, leg holds 30 degree position for full 5 secs  6b. Motor Leg, Right: 0-->No drift, leg holds 30 degree position for full 5 secs  7. Limb Ataxia: 0-->Absent  8. Sensory: 0-->Normal, no sensory loss  9. Best Language: 0-->No aphasia, normal  10. Dysarthria: 0-->Normal  11. Extinction and Inattention (formerly Neglect): 0-->No abnormality  Total (NIH Stroke Scale): 0       Modified [place] Score: 0  [place] Coma Scale:    ABCD2 Score:    [id number] Score:2  HAS -BLED Score:2  ICH Score:   Hunt & Rodríguez Classification:      Hemorrhagic change of an Ischemic Stroke: Does this patient have an ischemic stroke with hemorrhagic changes? No     Neurologic Chief Complaint:  R MCA syndrome    Subjective:     Interval History: Patient is seen for follow-up neurological assessment and treatment recommendations: No acute issues overnight. No respiratory issues, MRI brain reveals acute  infarct,  Ready for discharge    HPI, Past Medical, Family, and Social History remains the same as documented in the initial encounter.     Review of Systems   Constitutional: Negative for chills and fever.   Respiratory: Negative for cough and shortness of breath.    Genitourinary: Negative for dysuria and enuresis.   Neurological: Negative for speech difficulty and weakness.     Scheduled Meds:   atorvastatin  40 mg Oral Daily    bisoprolol  5 mg Oral Daily    And    hydrochlorothiazide  6.5 mg Oral Daily    levETIRAcetam  1,000 mg Oral BID    mupirocin   Nasal BID    rivaroxaban  20 mg Oral Daily with dinner     Continuous Infusions:   sodium chloride 0.9%       PRN Meds:acetaminophen, labetaloL, ondansetron, sodium chloride 0.9%, sodium chloride 0.9%    Objective:     Vital Signs (Most Recent):  Temp: 98.1 °F (36.7 °C) (11/17/20 1100)  Pulse: 78 (11/17/20 0719)  Resp: (!) 21 (11/16/20 1915)  BP: (!) 144/80 (11/17/20 1100)  SpO2: 95 % (11/17/20 0740)  BP Location: Right arm    Vital Signs Range (Last 24H):  Temp:  [98 °F (36.7 °C)-98.5 °F (36.9 °C)]   Pulse:  [70-79]   Resp:  [21-22]   BP: (139-144)/(80-92)   SpO2:  [94 %-96 %]   BP Location: Right arm    Physical Exam  Vitals signs and nursing note reviewed.   Constitutional:       Appearance: Normal appearance.   Pulmonary:      Effort: Pulmonary effort is normal.      Breath sounds: Normal breath sounds.   Neurological:      General: No focal deficit present.      Mental Status: He is alert and oriented to person, place, and time.         Neurological Exam:   LOC: alert  Attention Span: Good   Language: No aphasia  Articulation: No dysarthria  Orientation: Person, Place, Time   Visual Fields: Full  Motor: Arm left  Normal 5/5  Leg left  Normal 5/5  Arm right  Normal 5/5  Leg right Normal 5/5    Laboratory:  CMP:   Recent Labs   Lab 11/17/20  0313   CALCIUM 8.9   ALBUMIN 3.5   PROT 6.5      K 3.9   CO2 25      BUN 14   CREATININE 0.8    ALKPHOS 83   ALT 21   AST 17   BILITOT 0.5     BMP:   Recent Labs   Lab 11/17/20 0313      K 3.9      CO2 25   BUN 14   CREATININE 0.8   CALCIUM 8.9     CBC:   Recent Labs   Lab 11/17/20 0313   WBC 6.18   RBC 4.48*   HGB 13.9*   HCT 42.4      MCV 95   MCH 31.0   MCHC 32.8     Lipid Panel:   Recent Labs   Lab 11/16/20 0329   CHOL 184   LDLCALC 119.8   HDL 41   TRIG 116     Coagulation:   Recent Labs   Lab 11/16/20 0329   INR 1.0     Platelet Aggregation Study: No results for input(s): PLTAGG, PLTAGINTERP, PLTAGREGLACO, ADPPLTAGGREG in the last 168 hours.  Hgb A1C:   Recent Labs   Lab 11/16/20 0329   HGBA1C 5.6     TSH:   Recent Labs   Lab 11/16/20 0329   TSH 2.372       Diagnostic Results     Brain Imaging   MRI Brain w/o contrast 11-17-20 results:    Diffusion restriction in the right subinsular cortex and in the right parietal lobe, consistent with acute infarction in the right MCA territory.    Vessel Imaging   CTA Head and neck multiphase 11-16-20 results:  No acute abnormality. No high-grade stenosis or major vessel occlusion.     Prominent perivascular space in the right basal ganglia.     Large low-density lesion in the right paratracheal mediastinum measuring up to 4.5 cm in craniocaudal length.  This finding could represent a cystic mediastinal lesion or potentially enlarged cystic lymph node.  Correlation with any remote outside imaging of the chest could be helpful to confirm stability if available.  Follow-up with outpatient contrast enhanced chest CT is suggested if no prior imaging is available.    Cardiac Imaging   2 D Echo 11-16-20 results:  · Technically difficult portable study on a COVID+ patient  · The left ventricle is normal in size with normal systolic function. The estimated ejection fraction is 60%.  · Normal right ventricular size with normal right ventricular systolic function.  · Normal left ventricular diastolic function.  · Elevated central venous pressure (15  mmHg).      Allyssa Meneses NP  [facility]  Department of Vascular Neurology   [facility] - ICU 15 WT

## 2020-11-17 NOTE — HOSPITAL COURSE
11-16-20 54 y/o male with L MCA syndrome that resolved with no LVO and no TPA as on Xarelto. Patient COVID +.   Patient states that he takes his Xarelto in the am without food.   Patient changed to keppra from Dilantin, Lipitor increased to 40 mg daily  11-17-20 MRI brain reveals R subinsular cortex and R Parietal lobe  Long discussion with patient regarding taking Xarelto correctly with dinner,  Need to self quarantine for 14 days when he is discharged home today  Keppra to be delivered at bedside as unable to send to Pharmacy in [place]   Will place order for home care COVID program

## 2020-11-17 NOTE — DISCHARGE SUMMARY
[facility] ICU 15   Vascular Neurology  Comprehensive Stroke Center  Discharge Summary     Summary:     Admit Date: 11/16/2020  1:56 AM    Discharge Date and Time: No discharge date for patient encounter.    Attending Physician: Nu Jimenez MD     Discharge Provider: Allyssa Meneses NP    History of Present Illness: 56 y/o male who went to bed early as he was tired at 1945. He woke up on the floor unable to move the left side or speak properly. He final was able to crawl to the darrell and call for help. He was taken to [facility] where he was seen in Telemedicine by Dr bowling. No TPA as on Xarelto.   Patient transferred to [facility] for further evaluation.   Patient states that for the past few days he has been feeling off and a little dizzy also having a slight headache   Patient tested COVID + at [facility] did not find out till patient informed us on arrival plus result was in packet.    Patient went for CTA head and neck multiphase and no LVO seen but upon review of packet from [facility] and  they had already completed a CTA head and neck and did not notify the [facility] Referral centerof this.     Patient states he he is back to baseline and feels fine.  Patient states that he has not had a seizure in 3 years and they had just decreased his Dilantin to 200 mg BID.  Upon talking with patient he takes his Xarelto in the am and not with food, instructed patient to take it at same time each day with food so he states will start taking it with evening dinner.     Patient states he has not been around sick people and has no COVID symptoms.     Patient could have had a TIA that resolved as not taking Xarelto properly vs seizure     Risk factors Chronic afib on Xarelto, SRINIVAS, HLP, obesity, seizures.    Hospital Course (synopsis of major diagnoses, care, treatment, and services provided during the course of the hospital stay): 11-16-20 56 y/o male with L MCA syndrome that  resolved with no LVO and no TPA as on Xarelto. Patient COVID +.   Patient states that he takes his Xarelto in the am without food.   Patient changed to keppra from Dilantin, Lipitor increased to 40 mg daily  11-17-20 MRI brain reveals R subinsular cortex and R Parietal lobe  Long discussion with patient regarding taking Xarelto correctly with dinner,  Need to self quarantine for 14 days when he is discharged home today  Keppra to be delivered at bedside as unable to send to Pharmacy in [place]   Will place order for home care COVID program      Stroke Etiology: Evident Atrial fibrillation Cardio-Aortic Embolism (CE)    STROKE DOCUMENTATION   Acute Stroke Times   Last Known Normal Date: 11/15/20  Last Known Normal Time: 1945  Symptom Onset Date: 11/15/20  Symptom Onset Time: 2115  Stroke Team Called Date: 11/15/20  Stroke Team Called Time: 2204  Stroke Team Arrival Date: 11/16/20  Stroke Team Arrival Time: 0200     NIH Scale:  1a. Level of Consciousness: 0-->Alert, keenly responsive  1b. LOC Questions: 0-->Answers both questions correctly  1c. LOC Commands: 0-->Performs both tasks correctly  2. Best Gaze: 0-->Normal  3. Visual: 0-->No visual loss  4. Facial Palsy: 0-->Normal symmetrical movements  5a. Motor Arm, Left: 0-->No drift, limb holds 90 (or 45) degrees for full 10 secs  5b. Motor Arm, Right: 0-->No drift, limb holds 90 (or 45) degrees for full 10 secs  6a. Motor Leg, Left: 0-->No drift, leg holds 30 degree position for full 5 secs  6b. Motor Leg, Right: 0-->No drift, leg holds 30 degree position for full 5 secs  7. Limb Ataxia: 0-->Absent  8. Sensory: 0-->Normal, no sensory loss  9. Best Language: 0-->No aphasia, normal  10. Dysarthria: 0-->Normal  11. Extinction and Inattention (formerly Neglect): 0-->No abnormality  Total (NIH Stroke Scale): 0        Modified Dans[place] Score: 0  Keagan Coma Scale:    ABCD2 Score:    [id number] Score:2  HAS -BLED Score:2  ICH Score:   Hunt & Rodríguez Classification:        Assessment/Plan:     Diagnostic Results:      Brain Imaging:   MRI Brain w/o contrast 11-17-20 results:    Diffusion restriction in the right subinsular cortex and in the right parietal lobe, consistent with acute infarction in the right MCA territory.     Vessel Imaging   CTA Head and neck multiphase 11-16-20 results:  No acute abnormality. No high-grade stenosis or major vessel occlusion.     Prominent perivascular space in the right basal ganglia.     Large low-density lesion in the right paratracheal mediastinum measuring up to 4.5 cm in craniocaudal length.  This finding could represent a cystic mediastinal lesion or potentially enlarged cystic lymph node.  Correlation with any remote outside imaging of the chest could be helpful to confirm stability if available.  Follow-up with outpatient contrast enhanced chest CT is suggested if no prior imaging is available.     Cardiac Imaging   2 D Echo 11-16-20 results:  · Technically difficult portable study on a COVID+ patient  · The left ventricle is normal in size with normal systolic function. The estimated ejection fraction is 60%.  · Normal right ventricular size with normal right ventricular systolic function.  · Normal left ventricular diastolic function.  · Elevated central venous pressure (15 mmHg).    Interventions: None    Complications: None    Disposition: Home or Self Care    Final Active Diagnoses:    Diagnosis Date Noted POA    PRINCIPAL PROBLEM:  Cerebrovascular accident (CVA) due to embolism of left middle cerebral artery [I63.412] 11/16/2020 Yes    Chronic a-fib [I48.20]  Yes    Seizure disorder [G40.909]  Yes    Hyperlipidemia, mixed [E78.2]  Yes    Lupus anticoagulant disorder [D68.62]  Yes    SRINIVAS (obstructive sleep apnea) [G47.33]  Yes    COVID-19 virus infection [U07.1] 11/16/2020 Yes    Obesity due to excess calories [E66.09] 11/16/2020 Yes      Problems Resolved During this Admission:     No new Assessment & Plan notes have been  filed under this hospital service since the last note was generated.  Service: Vascular Neurology      Recommendations:     Post-discharge complication risks: None    Stroke Education given to: patient    Follow-up in Stroke Clinic in 6 weeks days.     Discharge Plan:  Statin: Atorvastatin 40mg  Anticoagulant: Rivaroxaban  Aggresive risk factor modification:  High Cholesterol  Exercise  Obesity  Atrial Fibrillation  SRINIVAS    Instructed to self Quarantine for 14 days  Instructed to restart using CPAP nightly  Instructed on [facility] program  [facility] sending to [facility] in [place]      Follow Up:  Follow-up Information     [facility] VASCULAR NEUROLOGY In 6 weeks.    Specialty: Vascular Neurology  Why: s/p stroke with Dr Jimenez  Contact information:  [address]  [place] [id number] [phone]           [facility] NEUROLOGY In 6 weeks.    Specialty: Neurology  Why:  hospital follow-up - PATIENT TO BE SEEN IN [place] BY DR KAY  Contact information:  [address]  [place] [id number] [phone]           Patrick Isabel MD. Schedule an appointment as soon as possible for a visit in 14 days.    Specialty: Family Medicine  Why: hospital follow-up - PLEASE CALL TO SCHEDULE APPOINTMENT - OFFICE STAFF UNABLE TO SCHEDULE AT TIME OF DISCHARGE , also needs follow up re chest lesion per CTA Large low-density lesion in the right paratracheal mediastinum measuring up to 4.5 cm in crani   Contact information:  [address]  [address]  [facility]  [place] [address]  [phone]                   Patient Instructions:      Diet Cardiac     COVID-19 Surveillance Program     Order Specific Question Answer Comments   Does patient have a smartphone? Yes    Does patient have the [facility] on their smartphone? No    While in surveillance program, will patient be using home oxygen? No      Other restrictions (specify):   Order Comments:  Self quarantine for 14 days     Notify your health care provider if you experience any of the following:   Order Comments: Call 911 for any stroke like symptoms     Activity as tolerated       Medications:  Reconciled Home Medications:      Medication List      START taking these medications    levETIRAcetam 1000 MG tablet  Commonly known as: KEPPRA  Take 1 tablet (1,000 mg total) by mouth 2 (two) times daily.     mupirocin 2 % ointment  Commonly known as: BACTROBAN  by Nasal route 2 (two) times daily. for 5 days     pulse oximeter device  Commonly known as: pulse oximeter  by Apply Externally route 2 (two) times a day. Use twice daily at 8 AM and 3 PM and record the value in [employer] as directed.        CHANGE how you take these medications    atorvastatin 10 MG tablet  Commonly known as: LIPITOR  Take 4 tablets (40 mg total) by mouth once daily.  What changed: how much to take     rivaroxaban 10 mg Tab  Commonly known as: XARELTO  Take 2 tablets (20 mg total) by mouth daily with dinner or evening meal.  What changed: how much to take        CONTINUE taking these medications    bisoprolol-hydrochlorothiazide 5-6.25 mg 5-6.25 mg Tab  Commonly known as: ZIAC  Take 1 tablet by mouth once daily.        STOP taking these medications    phenytoin 100 MG ER capsule  Commonly known as: DILANTIN     simvastatin 20 MG tablet  Commonly known as: REBA Meneses NP  [facility] Stroke [facility]  Department of Vascular Neurology   [facility] ICU [facility] WT

## 2020-11-17 NOTE — ASSESSMENT & PLAN NOTE
Stroke risk factor  continue home ZIAC  Xarelto 20 mg daily  Instructed patient on correct way to take Xarelto with food

## 2020-11-17 NOTE — NURSING
"Pt AAOx3 laying in bed. Pt's telemetry not on, attempted to reapply telemetry. Pt refusing telemetry, states it is "too cumbersome."    "

## 2020-11-17 NOTE — SUBJECTIVE & OBJECTIVE
Neurologic Chief Complaint:  R MCA syndrome    Subjective:     Interval History: Patient is seen for follow-up neurological assessment and treatment recommendations: No acute issues overnight. No respiratory issues, MRI brain reveals acute infarct,  Ready for discharge    HPI, Past Medical, Family, and Social History remains the same as documented in the initial encounter.     Review of Systems   Constitutional: Negative for chills and fever.   Respiratory: Negative for cough and shortness of breath.    Genitourinary: Negative for dysuria and enuresis.   Neurological: Negative for speech difficulty and weakness.     Scheduled Meds:   atorvastatin  40 mg Oral Daily    bisoprolol  5 mg Oral Daily    And    hydrochlorothiazide  6.5 mg Oral Daily    levETIRAcetam  1,000 mg Oral BID    mupirocin   Nasal BID    rivaroxaban  20 mg Oral Daily with dinner     Continuous Infusions:   sodium chloride 0.9%       PRN Meds:acetaminophen, labetaloL, ondansetron, sodium chloride 0.9%, sodium chloride 0.9%    Objective:     Vital Signs (Most Recent):  Temp: 98.1 °F (36.7 °C) (11/17/20 1100)  Pulse: 78 (11/17/20 0719)  Resp: (!) 21 (11/16/20 1915)  BP: (!) 144/80 (11/17/20 1100)  SpO2: 95 % (11/17/20 0740)  BP Location: Right arm    Vital Signs Range (Last 24H):  Temp:  [98 °F (36.7 °C)-98.5 °F (36.9 °C)]   Pulse:  [70-79]   Resp:  [21-22]   BP: (139-144)/(80-92)   SpO2:  [94 %-96 %]   BP Location: Right arm    Physical Exam  Vitals signs and nursing note reviewed.   Constitutional:       Appearance: Normal appearance.   Pulmonary:      Effort: Pulmonary effort is normal.      Breath sounds: Normal breath sounds.   Neurological:      General: No focal deficit present.      Mental Status: He is alert and oriented to person, place, and time.         Neurological Exam:   LOC: alert  Attention Span: Good   Language: No aphasia  Articulation: No dysarthria  Orientation: Person, Place, Time   Visual Fields: Full  Motor: Arm left   Normal 5/5  Leg left  Normal 5/5  Arm right  Normal 5/5  Leg right Normal 5/5    Laboratory:  CMP:   Recent Labs   Lab 11/17/20 0313   CALCIUM 8.9   ALBUMIN 3.5   PROT 6.5      K 3.9   CO2 25      BUN 14   CREATININE 0.8   ALKPHOS 83   ALT 21   AST 17   BILITOT 0.5     BMP:   Recent Labs   Lab 11/17/20 0313      K 3.9      CO2 25   BUN 14   CREATININE 0.8   CALCIUM 8.9     CBC:   Recent Labs   Lab 11/17/20 0313   WBC 6.18   RBC 4.48*   HGB 13.9*   HCT 42.4      MCV 95   MCH 31.0   MCHC 32.8     Lipid Panel:   Recent Labs   Lab 11/16/20 0329   CHOL 184   LDLCALC 119.8   HDL 41   TRIG 116     Coagulation:   Recent Labs   Lab 11/16/20 0329   INR 1.0     Platelet Aggregation Study: No results for input(s): PLTAGG, PLTAGINTERP, PLTAGREGLACO, ADPPLTAGGREG in the last 168 hours.  Hgb A1C:   Recent Labs   Lab 11/16/20 0329   HGBA1C 5.6     TSH:   Recent Labs   Lab 11/16/20 0329   TSH 2.372       Diagnostic Results     Brain Imaging   MRI Brain w/o contrast 11-17-20 results:    Diffusion restriction in the right subinsular cortex and in the right parietal lobe, consistent with acute infarction in the right MCA territory.    Vessel Imaging   CTA Head and neck multiphase 11-16-20 results:  No acute abnormality. No high-grade stenosis or major vessel occlusion.     Prominent perivascular space in the right basal ganglia.     Large low-density lesion in the right paratracheal mediastinum measuring up to 4.5 cm in craniocaudal length.  This finding could represent a cystic mediastinal lesion or potentially enlarged cystic lymph node.  Correlation with any remote outside imaging of the chest could be helpful to confirm stability if available.  Follow-up with outpatient contrast enhanced chest CT is suggested if no prior imaging is available.    Cardiac Imaging   2 D Echo 11-16-20 results:  · Technically difficult portable study on a COVID+ patient  · The left ventricle is normal in size with  normal systolic function. The estimated ejection fraction is 60%.  · Normal right ventricular size with normal right ventricular systolic function.  · Normal left ventricular diastolic function.  · Elevated central venous pressure (15 mmHg).

## 2020-11-17 NOTE — NURSING
"Since meds are ready and will be delivered shortly, pt agreeable on [facility] bedside delivery.    "Discharge order rec not completeted" appearing on AVS confirmation. Informed Allyssa Meneses NP. She stated everything is completed.      "

## 2020-11-17 NOTE — NURSING
Pt discharged per MD orders.  Tele discontinued and returned to station.  IV discontinued; catheter tip intact x 2. Medication list and prescriptions reviewed; prescriptions sent to bedside pharmacy pt will pick them up on the way out. pt verbalizes understanding of all written and verbal discharge instructions. NP came back and went over dc instructions with the pt again to make sure he understood  That he will need to follow up with his doctor and the treatment that he received here.

## 2020-11-17 NOTE — NURSING
Going over discharge instructions with patient, f/u states to have CT of chest for lesion. Pt states he was not informed about a lesion. Pt becoming upset, feels like discharge plan was not fully discussed with him. Allyssa messaged and notified, ask to please call pt and talk to him.   Charge RN, Susannah informed while waiting to hear back from Allyssa, NP    Susannah spoke with patient, Allyssa came to bedside and spoke with patient. Susannah came out and stated she was walking patient down (ride has been waiting).   IV already d/c'd.

## 2020-11-17 NOTE — ASSESSMENT & PLAN NOTE
56 y/o male with R MCA syndrome that resolved no TPA as on Xarelto, no LVO    Antithrombotics : Xarelto 20 mg daily    Statins: Lipitor 40 mg daily    Aggressive risk factor modification: HLD, Diet, Exercise, Obesity, A-Fib     Rehab efforts: The patient has been evaluated by a stroke team provider and the therapy needs have been fully considered based off the presenting complaints and exam findings. The following therapy evaluations are needed: None    Diagnostics ordered/pending: None     VTE prophylaxis: Mechanical prophylaxis: Place SCDs  None: Reason for No Pharmacological VTE Prophylaxis: Currently on anticoagulation    BP parameters: TIA: SBP <220 until imaging confirmation of no infarct

## 2020-11-18 ENCOUNTER — NURSE TRIAGE (OUTPATIENT)
Dept: ADMINISTRATIVE | Facility: CLINIC | Age: 55
End: 2020-11-18

## 2020-11-18 NOTE — PLAN OF CARE
Patient medically ready for discharge to HOME WITH FAMILY. Any necessary transport setup by . This CM scheduled or requested necessary follow-up appointments. Family/patient aware of discharge.    HOSPITAL FOLLOW-UP APPTS at [facility] - REQUESTED AND OFFICE TO NOTIFY PATIENT OF APPOINTMENT DATE AND TIME    [facility] APPTS WITH DR KAY AND DR DORCAS MORE TO SCHEDULE AS INSTRUCTED    Alejandrina Vergara RN  Case Management  Ext: [phone]  11/18/2020 11/18/20 1039   Final Note   Assessment Type Final Discharge Note   Anticipated Discharge Disposition Home   What phone number can be called within the next 1-3 days to see how you are doing after discharge? [phone]   Hospital Follow Up  Appt(s) scheduled? No  (ALL APPOINTMENTS REQUESTED FOR [facility] [place] / [facility] TO MAKE [facility] APPOINTMENTS)   Discharge plans and expectations educations in teach back method with documentation complete? Yes  (PER BEDSIDE NURSE AND NEUROVASCULAR NP)   Right Care Referral Info   Post Acute Recommendation No Care   Post-Acute Status   Other Status No Post-Acute Service Needs   Discharge Delays None known at this time

## 2020-11-18 NOTE — TELEPHONE ENCOUNTER
Pt was enrolled in Covid Surveillance program but pt lives in [place] so therefore not eligible for surveillance. Attempted to contact pt with no answer x2. Left voicemail. Unable to send MyChart due to pending status. Removed surveillance tasks and enrolled pt in text home symptom monitoring.    Reason for Disposition   Message left on identified voice mail    Protocols used: NO CONTACT OR DUPLICATE CONTACT CALL-A-AH

## 2020-11-18 NOTE — TELEPHONE ENCOUNTER
Covid-19 surveillance program enrollment call attempted. No contact made, left VM message. Portal is inactive. Sent activation code via text message.     Reason for Disposition   Message left on identified voice mail    Additional Information   Negative: Caller is angry or rude (e.g., hangs up, verbally abusive, yelling)   Negative: Caller hangs up   Negative: Caller has already spoken with the PCP and has no further questions.   Negative: Caller has already spoken with another triager and has no further questions.   Negative: Caller has already spoken with another triager or PCP AND has further questions AND triager able to answer questions.   Negative: Busy signal.  First attempt to contact caller.  Follow-up call scheduled within 15 minutes.   Negative: No answer.  First attempt to contact caller.  Follow-up call scheduled within 15 minutes.    Protocols used: NO CONTACT OR DUPLICATE CONTACT CALL-A-

## 2020-11-19 ENCOUNTER — PATIENT OUTREACH (OUTPATIENT)
Dept: ADMINISTRATIVE | Facility: CLINIC | Age: 55
End: 2020-11-19

## 2020-11-19 NOTE — PROGRESS NOTES
C3 nurse attempted to contact patient. No answer. The following message was left for the patient to return the call:  Good Afternoon  I am a nurse calling on behalf of [facility] from the Care Coordination Center.  This is a Transitional Care Call for Brian Candelaria . When you have a moment please contact us at [phone] or [phone] Monday through Friday, between the hours of 8 am to 4 pm. We look forward to speaking with you. On behalf of [facility] have a nice day.    The patient does not have a scheduled HOSFU appointment within 7-14 days post hospital discharge date 11/17/2020 Message sent to Physician staff to assist with HOSFU appointment scheduling.

## 2020-11-20 NOTE — PATIENT INSTRUCTIONS
Stroke (Completed)    You have had a mild stroke, or cerebrovascular accident (CVA). This is caused by a loss of blood flow to part of your brain. This can occur when a blood clot forms inside the carotid artery (main artery from the heart to the brain) or inside the heart. When the clot travels to the brain, it can lodge in a blood vessel and block blood flow. The other common cause of stroke is a gradual narrowing of the arteries in the brain due to buildup of fatty deposits (plaque).  Symptoms  Blocked blood flow in different areas of the brain can cause different symptoms. If you have had a stroke before, a new one may be different. A memory aid for the basic signs of a stroke is F.A.S.T.  F.A.S.T.  · F: Face drooping, or numbness on one side. This may be more noticeable when you ask the affected person to smile.  · A: Arm weakness or numbness. The affected person may have trouble using or lifting one side.  · S: Speech difficulty. Speech may be slurred or hard to understand. The affected person may also use the wrong words.  · T: Time to call 911. Time is critical in treating a stroke. Call 911 as soon as you suspect a stroke has happened--even a small one. The sooner treatment is started the better, even if the symptoms go away.  Other common symptoms of a stroke include:  · Having difficulty getting the right words to come out  · Weakness in one leg  · Numbness on one side  · Difficulty walking  · Trouble with coordination  · Trouble with vision  · Headache  · Confusion  · Dizziness  Treatment  After you have had a stroke, you are at risk of having another. Be sure to follow up with your healthcare provider for further evaluation and treatment. If problems are found, your healthcare provider will recommend treatment with medicines and/or procedures.  To reduce your chance of having another stroke, you may be prescribed medicines. These include medicines to prevent blood clots, such as antiplatelet or  anticoagulant medicines.  Home care  · Rest at home and avoid exertion for the next few days.  · If your healthcare provider has prescribed medicines, take them as directed.  Follow-up care  Follow up with your healthcare provider, or as advised. Additional tests may be needed. If you had an X-ray, CT scan, MRI, or ECG (electrocardiogram), it will be reviewed by a specialist. You will be notified of any new findings that will affect your care.  Call 911  Contact emergency services right away if any of these occur:  · Any of your stroke symptoms worsen  · New problems with speech, confusion, vision, walking, coordination, facial droop, or weakness or numbness on one side of your body  · Severe headache, fainting spell, dizziness, or seizure  · Chest pain or shortness of breath  Remember F.A.S.T. (described above). If you notice warning signs and symptoms of stroke, CALL 911 without delay.  Date Last Reviewed: 9/21/2015  © 8273-2020 [employer]. [address] [place] [address], [place] [address]. All rights reserved. This information is not intended as a substitute for professional medical care. Always follow your healthcare professional's instructions.